# Patient Record
Sex: FEMALE | Race: WHITE | NOT HISPANIC OR LATINO | URBAN - METROPOLITAN AREA
[De-identification: names, ages, dates, MRNs, and addresses within clinical notes are randomized per-mention and may not be internally consistent; named-entity substitution may affect disease eponyms.]

---

## 2020-03-19 ENCOUNTER — EMERGENCY (EMERGENCY)
Facility: HOSPITAL | Age: 85
LOS: 1 days | Discharge: HOME | End: 2020-03-19
Attending: INTERNAL MEDICINE | Admitting: INTERNAL MEDICINE
Payer: MEDICARE

## 2020-03-19 VITALS
HEART RATE: 64 BPM | RESPIRATION RATE: 18 BRPM | DIASTOLIC BLOOD PRESSURE: 61 MMHG | SYSTOLIC BLOOD PRESSURE: 96 MMHG | WEIGHT: 102.07 LBS | TEMPERATURE: 98 F | OXYGEN SATURATION: 98 %

## 2020-03-19 VITALS
HEART RATE: 70 BPM | OXYGEN SATURATION: 99 % | SYSTOLIC BLOOD PRESSURE: 112 MMHG | RESPIRATION RATE: 17 BRPM | DIASTOLIC BLOOD PRESSURE: 70 MMHG

## 2020-03-19 DIAGNOSIS — S01.01XA LACERATION WITHOUT FOREIGN BODY OF SCALP, INITIAL ENCOUNTER: ICD-10-CM

## 2020-03-19 PROCEDURE — 70450 CT HEAD/BRAIN W/O DYE: CPT

## 2020-03-19 PROCEDURE — 90715 TDAP VACCINE 7 YRS/> IM: CPT

## 2020-03-19 PROCEDURE — 90471 IMMUNIZATION ADMIN: CPT

## 2020-03-19 PROCEDURE — 99284 EMERGENCY DEPT VISIT MOD MDM: CPT | Mod: 25

## 2020-03-19 PROCEDURE — 12002 RPR S/N/AX/GEN/TRNK2.6-7.5CM: CPT

## 2020-03-19 PROCEDURE — 70450 CT HEAD/BRAIN W/O DYE: CPT | Mod: 26

## 2020-03-19 RX ORDER — ASPIRIN/CALCIUM CARB/MAGNESIUM 324 MG
1 TABLET ORAL
Qty: 0 | Refills: 0 | DISCHARGE

## 2020-03-19 RX ORDER — METFORMIN HYDROCHLORIDE 850 MG/1
1 TABLET ORAL
Qty: 0 | Refills: 0 | DISCHARGE

## 2020-03-19 RX ORDER — CHOLECALCIFEROL (VITAMIN D3) 125 MCG
0 CAPSULE ORAL
Qty: 0 | Refills: 0 | DISCHARGE

## 2020-03-19 RX ORDER — MULTIVIT-MIN/FERROUS GLUCONATE 9 MG/15 ML
1 LIQUID (ML) ORAL
Qty: 0 | Refills: 0 | DISCHARGE

## 2020-03-19 RX ORDER — TETANUS TOXOID, REDUCED DIPHTHERIA TOXOID AND ACELLULAR PERTUSSIS VACCINE, ADSORBED 5; 2.5; 8; 8; 2.5 [IU]/.5ML; [IU]/.5ML; UG/.5ML; UG/.5ML; UG/.5ML
0.5 SUSPENSION INTRAMUSCULAR ONCE
Refills: 0 | Status: COMPLETED | OUTPATIENT
Start: 2020-03-19 | End: 2020-03-19

## 2020-03-19 RX ADMIN — TETANUS TOXOID, REDUCED DIPHTHERIA TOXOID AND ACELLULAR PERTUSSIS VACCINE, ADSORBED 0.5 MILLILITER(S): 5; 2.5; 8; 8; 2.5 SUSPENSION INTRAMUSCULAR at 17:20

## 2020-03-19 NOTE — ED PROVIDER NOTE - PATIENT PORTAL LINK FT
You can access the FollowMyHealth Patient Portal offered by [facility] by registering at the following website: [email or web address]. By joining [employer]’s FollowMyHealth portal, you will also be able to view your health information using other applications (apps) compatible with our system.

## 2020-03-19 NOTE — ED PROVIDER NOTE - CLINICAL SUMMARY MEDICAL DECISION MAKING FREE TEXT BOX
89 y/o F s/p witnessed trip and fall at home with Aid hitting the back of her head on ASA 81mg daily. Denies LOC, dizziness, nausea, vomiting, visual changes, weakness, numbness, tingling. Alert, oriented, following commands. Check CT head, repair laceration, strict return precautions. 89 y/o F s/p witnessed trip and fall at home with Aid hitting the back of her head on ASA 81mg daily. Denies LOC, dizziness, nausea, vomiting, visual changes, weakness, numbness, tingling. Alert, oriented, following commands. Check CT head, repair laceration, administer Tdap, strict return precautions. 89 y/o F s/p witnessed trip and fall at home with Aid hitting the back of her head on ASA 81mg daily. Denies LOC, dizziness, nausea, vomiting, visual changes, weakness, numbness, tingling. Alert, oriented, following commands. Check CT head, repair laceration, administer Tdap, strict return precautions.   WILMA Blancharderio- 7 staples placed posterior scalp, Tdap adminsitered

## 2020-03-19 NOTE — ED ADULT NURSE NOTE - CAS DISCH TRANSFER METHOD
Detail Level: Detailed Quality 130: Documentation Of Current Medications In The Medical Record: Current Medications Documented Private car

## 2020-03-19 NOTE — ED PROVIDER NOTE - NSFOLLOWUPINSTRUCTIONS_ED_ALL_ED_FT
Follow up with your PMD within 24-48 hrs   Rest, Take Tylenol 650mg every 4-6 hours as needed for pain.  Return to the emergency department with ANY worsening or new concerning symptoms.     Head Injury, Adult:    There are many types of head injuries. Head injuries can be as minor as a bump, or they can be more severe. More severe head injuries include:  A jarring injury to the brain (concussion).  A bruise of the brain (contusion). This means there is bleeding in the brain that can cause swelling.  A cracked skull (skull fracture).  Bleeding in the brain that collects, clots, and forms a bump (hematoma).  After a head injury, you may need to be observed for a while in the emergency department or urgent care. Sometimes admission to the hospital is needed.    After a head injury has happened, most problems occur within the first 24 hours, but side effects may occur up to 7–10 days after the injury. It is important to watch your condition for any changes.    What are the causes?  There are many possible causes of a head injury. A serious head injury may happen to someone who is in a car accident (motor vehicle collision). Other causes of major head injuries include bicycle or motorcycle accidents, sports injuries, and falls.    Risk factors  This condition is more likely to occur in people who:  Drink a lot of alcohol or use drugs.  Are over the age of 65.  Are at risk for falls.  What are the symptoms?  There are many possible symptoms of a head injury. Visible symptoms of a head injury include a bruise, bump, or bleeding at the site of the injury. Other non-visible symptoms include:  Feeling sleepy or not being able to stay awake.  Passing out.  Headache.  Seizures.  Dizziness.  Confusion.  Memory problems.  Nausea or vomiting.  Other possible symptoms that may develop after the head injury include:  Poor attention and concentration.  Fatigue or tiring easily.  Irritability.  Being uncomfortable around bright lights or loud noises.  Anxiety or depression.  Disturbed sleep.  How is this diagnosed?  This condition can usually be diagnosed based on your symptoms, a description of the injury, and a physical exam. You may also have imaging tests done, such as a CT scan or MRI. You will also be closely watched.    How is this treated?  Treatment for this condition depends on the severity and type of injury you have. The main goal of treatment is to prevent complications and allow the brain time to heal.    For mild head injury, you may be sent home and treatment may include:  Observation. A responsible adult should stay with you for 24 hours after your injury and check on you often.  Physical rest.  Brain rest.  Pain medicines.  For severe brain injury, treatment may include:  Close observation. This includes hospitalization with frequent physical exams. You may need to go to a hospital that specializes in head injury.  Pain medicines.  Breathing support. This may include using a ventilator.  Managing the pressure inside the brain (intracranial pressure, or ICP). This may include:  Monitoring the ICP.  Giving medicines to decrease the ICP.  Positioning you to decrease the ICP.  Medicine to prevent seizures.  Surgery to stop bleeding or to remove blood clots (craniotomy).  Surgery to remove part of the skull (decompressive craniectomy). This allows room for the brain to swell.  Follow these instructions at home:  Activity     Rest as much as possible and avoid activities that are physically hard or tiring.  Make sure you get enough sleep.  Limit activities that require a lot of thought or attention, such as:  Watching TV.  Playing memory games and puzzles.  Job-related work or homework.  Working on the computer, social media, and texting.  Avoid activities that could cause another head injury, such as playing sports, until your health care provider approves. Having another head injury, especially before the first one has healed, can be dangerous.  Ask your health care provider when it is safe for you to return to your regular activities, including work or school. Ask your health care provider for a step-by-step plan for gradually returning to activities.  Ask your health care provider when you can drive, ride a bicycle, or use heavy machinery. Your ability to react may be slower after a brain injury. Never do these activities if you are dizzy.  Lifestyle     Do not drink alcohol until your health care provider approves, and avoid drug use. Alcohol and certain drugs may slow your recovery and can put you at risk of further injury.  If it is harder than usual to remember things, write them down.  If you are easily distracted, try to do one thing at a time.  Talk with family members or close friends when making important decisions.  Tell your friends, family, a trusted colleague, and  about your injury, symptoms, and restrictions. Have them watch for any new or worsening problems.  General instructions     Take over-the-counter and prescription medicines only as told by your health care provider.  Have someone stay with you for 24 hours after your head injury. This person should watch you for any changes in your symptoms and be ready to seek medical help, as needed.  Keep all follow-up visits as told by your health care provider. This is important.  How is this prevented?  Work on improving your balance and strength to avoid falls.  Wear a seatbelt when you are in a moving vehicle.  Wear a helmet when riding a bicycle, skiing, or doing any other sport or activity that has a risk of injury.  Drink alcohol only in moderation.  Take safety measures in your home, such as:  Removing clutter and tripping hazards from floors and stairways.  Using grab bars in bathrooms and handrails by stairs.  Placing non-slip mats on floors and in bathtubs.  Improving lighting in dim areas.  Get help right away if:  You have:  A severe headache that is not helped by medicine.  Trouble walking, have weakness in your arms and legs, or lose your balance.  Clear or bloody fluid coming from your nose or ears.  Changes in your vision.  A seizure.  You vomit.  Your symptoms get worse.  Your speech is slurred.  You pass out.  You are sleepier and have trouble staying awake.  Your pupils change size.  These symptoms may represent a serious problem that is an emergency. Do not wait to see if the symptoms will go away. Get medical help right away. Call your local emergency services (911 in the [place] Do not drive yourself to the hospital.     This information is not intended to replace advice given to you by your health care provider. Make sure you discuss any questions you have with your health care provider. Follow up with your PMD within 24-48 hrs   Return to the Emergency department in 10 days to remove the staples.   Keep wound covered for 48 hrs then clean gently with soap and water, apply bacitracin and cover.   Rest, Take Tylenol 650mg every 4-6 hours as needed for pain.  Return to the emergency department with ANY worsening or new concerning symptoms.     Head Injury, Adult:    There are many types of head injuries. Head injuries can be as minor as a bump, or they can be more severe. More severe head injuries include:  A jarring injury to the brain (concussion).  A bruise of the brain (contusion). This means there is bleeding in the brain that can cause swelling.  A cracked skull (skull fracture).  Bleeding in the brain that collects, clots, and forms a bump (hematoma).  After a head injury, you may need to be observed for a while in the emergency department or urgent care. Sometimes admission to the hospital is needed.    After a head injury has happened, most problems occur within the first 24 hours, but side effects may occur up to 7–10 days after the injury. It is important to watch your condition for any changes.    What are the causes?  There are many possible causes of a head injury. A serious head injury may happen to someone who is in a car accident (motor vehicle collision). Other causes of major head injuries include bicycle or motorcycle accidents, sports injuries, and falls.    Risk factors  This condition is more likely to occur in people who:  Drink a lot of alcohol or use drugs.  Are over the age of 65.  Are at risk for falls.  What are the symptoms?  There are many possible symptoms of a head injury. Visible symptoms of a head injury include a bruise, bump, or bleeding at the site of the injury. Other non-visible symptoms include:  Feeling sleepy or not being able to stay awake.  Passing out.  Headache.  Seizures.  Dizziness.  Confusion.  Memory problems.  Nausea or vomiting.  Other possible symptoms that may develop after the head injury include:  Poor attention and concentration.  Fatigue or tiring easily.  Irritability.  Being uncomfortable around bright lights or loud noises.  Anxiety or depression.  Disturbed sleep.  How is this diagnosed?  This condition can usually be diagnosed based on your symptoms, a description of the injury, and a physical exam. You may also have imaging tests done, such as a CT scan or MRI. You will also be closely watched.    How is this treated?  Treatment for this condition depends on the severity and type of injury you have. The main goal of treatment is to prevent complications and allow the brain time to heal.    For mild head injury, you may be sent home and treatment may include:  Observation. A responsible adult should stay with you for 24 hours after your injury and check on you often.  Physical rest.  Brain rest.  Pain medicines.  For severe brain injury, treatment may include:  Close observation. This includes hospitalization with frequent physical exams. You may need to go to a hospital that specializes in head injury.  Pain medicines.  Breathing support. This may include using a ventilator.  Managing the pressure inside the brain (intracranial pressure, or ICP). This may include:  Monitoring the ICP.  Giving medicines to decrease the ICP.  Positioning you to decrease the ICP.  Medicine to prevent seizures.  Surgery to stop bleeding or to remove blood clots (craniotomy).  Surgery to remove part of the skull (decompressive craniectomy). This allows room for the brain to swell.  Follow these instructions at home:  Activity     Rest as much as possible and avoid activities that are physically hard or tiring.  Make sure you get enough sleep.  Limit activities that require a lot of thought or attention, such as:  Watching TV.  Playing memory games and puzzles.  Job-related work or homework.  Working on the computer, social media, and texting.  Avoid activities that could cause another head injury, such as playing sports, until your health care provider approves. Having another head injury, especially before the first one has healed, can be dangerous.  Ask your health care provider when it is safe for you to return to your regular activities, including work or school. Ask your health care provider for a step-by-step plan for gradually returning to activities.  Ask your health care provider when you can drive, ride a bicycle, or use heavy machinery. Your ability to react may be slower after a brain injury. Never do these activities if you are dizzy.  Lifestyle     Do not drink alcohol until your health care provider approves, and avoid drug use. Alcohol and certain drugs may slow your recovery and can put you at risk of further injury.  If it is harder than usual to remember things, write them down.  If you are easily distracted, try to do one thing at a time.  Talk with family members or close friends when making important decisions.  Tell your friends, family, a trusted colleague, and  about your injury, symptoms, and restrictions. Have them watch for any new or worsening problems.  General instructions     Take over-the-counter and prescription medicines only as told by your health care provider.  Have someone stay with you for 24 hours after your head injury. This person should watch you for any changes in your symptoms and be ready to seek medical help, as needed.  Keep all follow-up visits as told by your health care provider. This is important.  How is this prevented?  Work on improving your balance and strength to avoid falls.  Wear a seatbelt when you are in a moving vehicle.  Wear a helmet when riding a bicycle, skiing, or doing any other sport or activity that has a risk of injury.  Drink alcohol only in moderation.  Take safety measures in your home, such as:  Removing clutter and tripping hazards from floors and stairways.  Using grab bars in bathrooms and handrails by stairs.  Placing non-slip mats on floors and in bathtubs.  Improving lighting in dim areas.  Get help right away if:  You have:  A severe headache that is not helped by medicine.  Trouble walking, have weakness in your arms and legs, or lose your balance.  Clear or bloody fluid coming from your nose or ears.  Changes in your vision.  A seizure.  You vomit.  Your symptoms get worse.  Your speech is slurred.  You pass out.  You are sleepier and have trouble staying awake.  Your pupils change size.  These symptoms may represent a serious problem that is an emergency. Do not wait to see if the symptoms will go away. Get medical help right away. Call your local emergency services (911 in the [place] Do not drive yourself to the hospital.     This information is not intended to replace advice given to you by your health care provider. Make sure you discuss any questions you have with your health care provider.

## 2020-03-19 NOTE — ED PROVIDER NOTE - CARE PLAN
Principal Discharge DX:	Traumatic injury of head, initial encounter  Secondary Diagnosis:	Laceration of scalp, initial encounter

## 2020-03-19 NOTE — ED PROVIDER NOTE - OBJECTIVE STATEMENT
89 y/o F s/p witnessed trip and fall at home with Aid hitting the back of her head on ASA 81mg daily. Denies LOC, dizziness, nausea, vomiting, visual changes, weakness, numbness, tingling. Alert, oriented, following commands. 89 y/o F s/p witnessed trip and fall at home with Aid hitting the back of her head on ASA 81mg daily. Denies LOC, dizziness, nausea, vomiting, visual changes, weakness, numbness, tingling. Alert, oriented, following commands. Last Tdap- unknown

## 2020-03-19 NOTE — ED ADULT NURSE NOTE - OBJECTIVE STATEMENT
Patient had a mechanical fall today at home per son. Lac to back of head. Son denies any change in mental status, shortness of breath or weakness.

## 2020-03-19 NOTE — ED PROVIDER NOTE - ATTENDING CONTRIBUTION TO CARE
89 y/o F s/p witnessed trip and fall at home with Aid hitting the back of her head on ASA 81mg daily. Denies LOC, dizziness, nausea, vomiting, visual changes, weakness, numbness, tingling. Alert, oriented, following commands. Check CT head, repair laceration, administer Tdap, strict return precautions.   PA Tiberio- 7 staples placed posterior scalp, Tdap adminsitered  Dr. Abrams:  I have reviewed and discussed with the PA/ resident the case specifics, including the history, physical assessment, evaluation, conclusion, laboratory results, and medical plan. I agree with the contents, and conclusions. I have personally examined, and interviewed the patient.

## 2020-03-19 NOTE — ED ADULT NURSE NOTE - NSIMPLEMENTINTERV_GEN_ALL_ED
Implemented All Fall with Harm Risk Interventions:  [place] to call system. Call bell, personal items and telephone within reach. Instruct patient to call for assistance. Room bathroom lighting operational. Non-slip footwear when patient is off stretcher. Physically safe environment: no spills, clutter or unnecessary equipment. Stretcher in lowest position, wheels locked, appropriate side rails in place. Provide visual cue, wrist band, yellow gown, etc. Monitor gait and stability. Monitor for mental status changes and reorient to person, place, and time. Review medications for side effects contributing to fall risk. Reinforce activity limits and safety measures with patient and family. Provide visual clues: red socks.

## 2020-03-30 ENCOUNTER — EMERGENCY (EMERGENCY)
Facility: HOSPITAL | Age: 85
LOS: 1 days | Discharge: HOME | End: 2020-03-30
Attending: EMERGENCY MEDICINE | Admitting: EMERGENCY MEDICINE
Payer: MEDICARE

## 2020-03-30 VITALS
HEART RATE: 63 BPM | OXYGEN SATURATION: 99 % | SYSTOLIC BLOOD PRESSURE: 108 MMHG | TEMPERATURE: 98 F | RESPIRATION RATE: 16 BRPM | DIASTOLIC BLOOD PRESSURE: 75 MMHG

## 2020-03-30 DIAGNOSIS — S01.01XD LACERATION WITHOUT FOREIGN BODY OF SCALP, SUBSEQUENT ENCOUNTER: ICD-10-CM

## 2020-03-30 PROBLEM — Z78.9 OTHER SPECIFIED HEALTH STATUS: Chronic | Status: ACTIVE | Noted: 2020-03-19

## 2020-03-30 PROCEDURE — G0463: CPT

## 2020-03-30 PROCEDURE — L9995: CPT

## 2020-03-30 NOTE — ED ADULT NURSE NOTE - NSHOSCREENINGQ1_ED_ALL_ED
Dapsone Pregnancy And Lactation Text: This medication is Pregnancy Category C and is not considered safe during pregnancy or breast feeding. No

## 2020-03-30 NOTE — ED PROVIDER NOTE - NEURO NEGATIVE STATEMENT, MLM
I have seen, taken a history and examined the patient. I agree with the evaluation and management plan   no loss of consciousness, no gait abnormality, no headache, no sensory deficits, and no weakness.

## 2020-03-30 NOTE — ED PROVIDER NOTE - PATIENT PORTAL LINK FT
You can access the FollowMyHealth Patient Portal offered by [facility] by registering at the following website: [email or web address]. By joining [email or web address]’s FollowMyHealth portal, you will also be able to view your health information using other applications (apps) compatible with our system.

## 2020-03-30 NOTE — ED PROVIDER NOTE - NSFOLLOWUPINSTRUCTIONS_ED_ALL_ED_FT
STAPLE CARE - AfterCare(R) Instructions(ER/ED)     Staple Care    WHAT YOU NEED TO KNOW:    Staples are often used to close a wound. Your staples may be placed for 3 to 14 days, depending on the location of your wound.    DISCHARGE INSTRUCTIONS:    Care for your wound:     Clean:   You may be able to shower in 24 hours. Do not soak your wound under water.      Gently wash your wound with soap and warm water daily. Lightly pat it dry. Do not cover your wound unless your healthcare provider tells you to.       You may also need to clean your wound with a mixture of hydrogen peroxide and water. Ask how to do this.      Do not apply ointment or cream to the wound unless your healthcare provider tells you to.      Elevate:   Rest any arm or leg that has a wound on pillows above the level of your heart. Do this as often as possible for 2 days. This will help decrease swelling and pain, and help you heal faster.          Minimize scarring:   Avoid sunshine on your wound to reduce scarring.         Follow up with your healthcare provider as directed: You may need to return for a wound checkup 3 days after your staples are placed. Ask when you should return to get your staples removed.    Staple removal:     A medical staple remover will be used to take out your staples. Your healthcare provider will slide the tool under each staple, squeeze the handle, and gently pull the staple out.       Medical tape will be placed on your wound once your staples are removed. This will help keep your wound closed. The medical tape will fall off on its own after several days.     Contact your healthcare provider if:     You have redness, pain, swelling, and pus draining from your wound.      Your pain medicine does not relieve your pain.      You have a fever of 101°F (38.5°C) or higher.      You have an odor coming from your wound.      You have questions or concerns about your condition or care.    Return to the emergency department if:     Your wound reopens.      You have red streaks in your skin that spread out from your wound.      You have severe pain or vomiting.         © Copyright [employer] 2020       back to top                      © Copyright [employer] 2020

## 2020-03-30 NOTE — ED ADULT NURSE NOTE - OBJECTIVE STATEMENT
Pt presents to ED for suture removal. Sutures placed on 3/19 s/p fall. Denies fever. Incision healing well.

## 2021-01-01 ENCOUNTER — INPATIENT (INPATIENT)
Facility: HOSPITAL | Age: 86
LOS: 3 days | DRG: 871 | End: 2021-07-11
Attending: INTERNAL MEDICINE | Admitting: INTERNAL MEDICINE
Payer: MEDICARE

## 2021-01-01 VITALS
SYSTOLIC BLOOD PRESSURE: 100 MMHG | DIASTOLIC BLOOD PRESSURE: 68 MMHG | HEART RATE: 69 BPM | OXYGEN SATURATION: 98 % | RESPIRATION RATE: 15 BRPM | TEMPERATURE: 99 F

## 2021-01-01 VITALS
HEART RATE: 85 BPM | WEIGHT: 95.02 LBS | HEIGHT: 60 IN | DIASTOLIC BLOOD PRESSURE: 45 MMHG | OXYGEN SATURATION: 94 % | RESPIRATION RATE: 12 BRPM | SYSTOLIC BLOOD PRESSURE: 87 MMHG | TEMPERATURE: 97 F

## 2021-01-01 DIAGNOSIS — A41.9 SEPSIS, UNSPECIFIED ORGANISM: ICD-10-CM

## 2021-01-01 DIAGNOSIS — E11.9 TYPE 2 DIABETES MELLITUS WITHOUT COMPLICATIONS: ICD-10-CM

## 2021-01-01 DIAGNOSIS — J96.90 RESPIRATORY FAILURE, UNSPECIFIED, UNSPECIFIED WHETHER WITH HYPOXIA OR HYPERCAPNIA: ICD-10-CM

## 2021-01-01 DIAGNOSIS — Z29.9 ENCOUNTER FOR PROPHYLACTIC MEASURES, UNSPECIFIED: ICD-10-CM

## 2021-01-01 DIAGNOSIS — F03.90 UNSPECIFIED DEMENTIA WITHOUT BEHAVIORAL DISTURBANCE: ICD-10-CM

## 2021-01-01 DIAGNOSIS — E78.5 HYPERLIPIDEMIA, UNSPECIFIED: ICD-10-CM

## 2021-01-01 LAB
A1C WITH ESTIMATED AVERAGE GLUCOSE RESULT: 5.4 % — SIGNIFICANT CHANGE UP (ref 4–5.6)
A1C WITH ESTIMATED AVERAGE GLUCOSE RESULT: 5.7 % — HIGH (ref 4–5.6)
ALBUMIN SERPL ELPH-MCNC: 2.6 G/DL — LOW (ref 3.3–5)
ALBUMIN SERPL ELPH-MCNC: 2.8 G/DL — LOW (ref 3.3–5)
ALBUMIN SERPL ELPH-MCNC: 2.9 G/DL — LOW (ref 3.3–5)
ALP SERPL-CCNC: 69 U/L — SIGNIFICANT CHANGE UP (ref 40–120)
ALP SERPL-CCNC: 94 U/L — SIGNIFICANT CHANGE UP (ref 40–120)
ALP SERPL-CCNC: 96 U/L — SIGNIFICANT CHANGE UP (ref 40–120)
ALT FLD-CCNC: 278 U/L — HIGH (ref 10–45)
ALT FLD-CCNC: 326 U/L — HIGH (ref 10–45)
ALT FLD-CCNC: 330 U/L — HIGH (ref 10–45)
ANION GAP SERPL CALC-SCNC: 3 MMOL/L — LOW (ref 5–17)
ANION GAP SERPL CALC-SCNC: 8 MMOL/L — SIGNIFICANT CHANGE UP (ref 5–17)
ANION GAP SERPL CALC-SCNC: 9 MMOL/L — SIGNIFICANT CHANGE UP (ref 5–17)
APPEARANCE UR: ABNORMAL
APPEARANCE UR: CLEAR — SIGNIFICANT CHANGE UP
AST SERPL-CCNC: 119 U/L — HIGH (ref 10–40)
AST SERPL-CCNC: 338 U/L — HIGH (ref 10–40)
AST SERPL-CCNC: 355 U/L — HIGH (ref 10–40)
B PERT DNA SPEC QL NAA+PROBE: SIGNIFICANT CHANGE UP
BACTERIA # UR AUTO: ABNORMAL /HPF
BACTERIA # UR AUTO: ABNORMAL /HPF
BASOPHILS # BLD AUTO: 0.03 K/UL — SIGNIFICANT CHANGE UP (ref 0–0.2)
BASOPHILS NFR BLD AUTO: 0.2 % — SIGNIFICANT CHANGE UP (ref 0–2)
BILIRUB SERPL-MCNC: 0.4 MG/DL — SIGNIFICANT CHANGE UP (ref 0.2–1.2)
BILIRUB SERPL-MCNC: 0.6 MG/DL — SIGNIFICANT CHANGE UP (ref 0.2–1.2)
BILIRUB SERPL-MCNC: 0.9 MG/DL — SIGNIFICANT CHANGE UP (ref 0.2–1.2)
BILIRUB UR-MCNC: ABNORMAL
BILIRUB UR-MCNC: NEGATIVE — SIGNIFICANT CHANGE UP
BLOOD GAS COMMENTS ARTERIAL: SIGNIFICANT CHANGE UP
BLOOD GAS COMMENTS ARTERIAL: SIGNIFICANT CHANGE UP
BLOOD GAS COMMENTS, VENOUS: SIGNIFICANT CHANGE UP
BUN SERPL-MCNC: 61 MG/DL — HIGH (ref 7–23)
BUN SERPL-MCNC: 66 MG/DL — HIGH (ref 7–23)
BUN SERPL-MCNC: 67 MG/DL — HIGH (ref 7–23)
C PNEUM DNA SPEC QL NAA+PROBE: SIGNIFICANT CHANGE UP
CALCIUM SERPL-MCNC: 7.8 MG/DL — LOW (ref 8.4–10.5)
CALCIUM SERPL-MCNC: 7.8 MG/DL — LOW (ref 8.4–10.5)
CALCIUM SERPL-MCNC: 9 MG/DL — SIGNIFICANT CHANGE UP (ref 8.4–10.5)
CHLORIDE SERPL-SCNC: 101 MMOL/L — SIGNIFICANT CHANGE UP (ref 96–108)
CHLORIDE SERPL-SCNC: 102 MMOL/L — SIGNIFICANT CHANGE UP (ref 96–108)
CHLORIDE SERPL-SCNC: 104 MMOL/L — SIGNIFICANT CHANGE UP (ref 96–108)
CK SERPL-CCNC: 294 U/L — HIGH (ref 25–170)
CO2 BLDV-SCNC: 31 MMOL/L — HIGH (ref 21–29)
CO2 SERPL-SCNC: 26 MMOL/L — SIGNIFICANT CHANGE UP (ref 22–31)
CO2 SERPL-SCNC: 27 MMOL/L — SIGNIFICANT CHANGE UP (ref 22–31)
CO2 SERPL-SCNC: 32 MMOL/L — HIGH (ref 22–31)
COLOR SPEC: YELLOW — SIGNIFICANT CHANGE UP
COLOR SPEC: YELLOW — SIGNIFICANT CHANGE UP
CREAT SERPL-MCNC: 1.14 MG/DL — SIGNIFICANT CHANGE UP (ref 0.5–1.3)
CREAT SERPL-MCNC: 1.3 MG/DL — SIGNIFICANT CHANGE UP (ref 0.5–1.3)
CREAT SERPL-MCNC: 1.34 MG/DL — HIGH (ref 0.5–1.3)
DIFF PNL FLD: ABNORMAL
DIFF PNL FLD: ABNORMAL
EOSINOPHIL # BLD AUTO: 0.01 K/UL — SIGNIFICANT CHANGE UP (ref 0–0.5)
EOSINOPHIL NFR BLD AUTO: 0.1 % — SIGNIFICANT CHANGE UP (ref 0–6)
EPI CELLS # UR: SIGNIFICANT CHANGE UP
EPI CELLS # UR: SIGNIFICANT CHANGE UP
ESTIMATED AVERAGE GLUCOSE: 108 MG/DL — SIGNIFICANT CHANGE UP (ref 68–114)
ESTIMATED AVERAGE GLUCOSE: 117 MG/DL — HIGH (ref 68–114)
FLUAV H1 2009 PAND RNA SPEC QL NAA+PROBE: SIGNIFICANT CHANGE UP
FLUAV H1 RNA SPEC QL NAA+PROBE: SIGNIFICANT CHANGE UP
FLUAV H3 RNA SPEC QL NAA+PROBE: SIGNIFICANT CHANGE UP
FLUAV SUBTYP SPEC NAA+PROBE: SIGNIFICANT CHANGE UP
FLUBV RNA SPEC QL NAA+PROBE: SIGNIFICANT CHANGE UP
GAS PNL BLDA: SIGNIFICANT CHANGE UP
GAS PNL BLDV: SIGNIFICANT CHANGE UP
GLUCOSE BLDC GLUCOMTR-MCNC: 121 MG/DL — HIGH (ref 70–99)
GLUCOSE BLDC GLUCOMTR-MCNC: 130 MG/DL — HIGH (ref 70–99)
GLUCOSE BLDC GLUCOMTR-MCNC: 130 MG/DL — HIGH (ref 70–99)
GLUCOSE BLDC GLUCOMTR-MCNC: 137 MG/DL — HIGH (ref 70–99)
GLUCOSE BLDC GLUCOMTR-MCNC: 139 MG/DL — HIGH (ref 70–99)
GLUCOSE BLDC GLUCOMTR-MCNC: 142 MG/DL — HIGH (ref 70–99)
GLUCOSE BLDC GLUCOMTR-MCNC: 150 MG/DL — HIGH (ref 70–99)
GLUCOSE BLDC GLUCOMTR-MCNC: 155 MG/DL — HIGH (ref 70–99)
GLUCOSE BLDC GLUCOMTR-MCNC: 158 MG/DL — HIGH (ref 70–99)
GLUCOSE BLDC GLUCOMTR-MCNC: 209 MG/DL — HIGH (ref 70–99)
GLUCOSE BLDC GLUCOMTR-MCNC: 216 MG/DL — HIGH (ref 70–99)
GLUCOSE BLDC GLUCOMTR-MCNC: 216 MG/DL — HIGH (ref 70–99)
GLUCOSE BLDC GLUCOMTR-MCNC: 264 MG/DL — HIGH (ref 70–99)
GLUCOSE SERPL-MCNC: 137 MG/DL — HIGH (ref 70–99)
GLUCOSE SERPL-MCNC: 178 MG/DL — HIGH (ref 70–99)
GLUCOSE SERPL-MCNC: 211 MG/DL — HIGH (ref 70–99)
GLUCOSE UR QL: NEGATIVE — SIGNIFICANT CHANGE UP
GLUCOSE UR QL: NEGATIVE — SIGNIFICANT CHANGE UP
HADV DNA SPEC QL NAA+PROBE: SIGNIFICANT CHANGE UP
HCOV PNL SPEC NAA+PROBE: SIGNIFICANT CHANGE UP
HCT VFR BLD CALC: 35.3 % — SIGNIFICANT CHANGE UP (ref 34.5–45)
HCT VFR BLD CALC: 35.5 % — SIGNIFICANT CHANGE UP (ref 34.5–45)
HGB BLD-MCNC: 11.3 G/DL — LOW (ref 11.5–15.5)
HGB BLD-MCNC: 11.4 G/DL — LOW (ref 11.5–15.5)
HMPV RNA SPEC QL NAA+PROBE: SIGNIFICANT CHANGE UP
HOROWITZ INDEX BLDA+IHG-RTO: SIGNIFICANT CHANGE UP
HOROWITZ INDEX BLDA+IHG-RTO: SIGNIFICANT CHANGE UP
HPIV1 RNA SPEC QL NAA+PROBE: SIGNIFICANT CHANGE UP
HPIV2 RNA SPEC QL NAA+PROBE: SIGNIFICANT CHANGE UP
HPIV3 RNA SPEC QL NAA+PROBE: SIGNIFICANT CHANGE UP
HPIV4 RNA SPEC QL NAA+PROBE: SIGNIFICANT CHANGE UP
IMM GRANULOCYTES NFR BLD AUTO: 0.9 % — SIGNIFICANT CHANGE UP (ref 0–1.5)
KETONES UR-MCNC: ABNORMAL
KETONES UR-MCNC: NEGATIVE — SIGNIFICANT CHANGE UP
LACTATE SERPL-SCNC: 1.5 MMOL/L — SIGNIFICANT CHANGE UP (ref 0.7–2)
LACTATE SERPL-SCNC: 1.6 MMOL/L — SIGNIFICANT CHANGE UP (ref 0.7–2)
LACTATE SERPL-SCNC: 2.1 MMOL/L — HIGH (ref 0.7–2)
LEUKOCYTE ESTERASE UR-ACNC: ABNORMAL
LEUKOCYTE ESTERASE UR-ACNC: ABNORMAL
LYMPHOCYTES # BLD AUTO: 0.91 K/UL — LOW (ref 1–3.3)
LYMPHOCYTES # BLD AUTO: 7.1 % — LOW (ref 13–44)
MAGNESIUM SERPL-MCNC: 2.5 MG/DL — SIGNIFICANT CHANGE UP (ref 1.6–2.6)
MAGNESIUM SERPL-MCNC: 2.7 MG/DL — HIGH (ref 1.6–2.6)
MCHC RBC-ENTMCNC: 30.4 PG — SIGNIFICANT CHANGE UP (ref 27–34)
MCHC RBC-ENTMCNC: 30.9 PG — SIGNIFICANT CHANGE UP (ref 27–34)
MCHC RBC-ENTMCNC: 32 GM/DL — SIGNIFICANT CHANGE UP (ref 32–36)
MCHC RBC-ENTMCNC: 32.1 GM/DL — SIGNIFICANT CHANGE UP (ref 32–36)
MCV RBC AUTO: 94.7 FL — SIGNIFICANT CHANGE UP (ref 80–100)
MCV RBC AUTO: 96.4 FL — SIGNIFICANT CHANGE UP (ref 80–100)
MONOCYTES # BLD AUTO: 1.81 K/UL — HIGH (ref 0–0.9)
MONOCYTES NFR BLD AUTO: 14.1 % — HIGH (ref 2–14)
NEUTROPHILS # BLD AUTO: 9.98 K/UL — HIGH (ref 1.8–7.4)
NEUTROPHILS NFR BLD AUTO: 77.6 % — HIGH (ref 43–77)
NITRITE UR-MCNC: NEGATIVE — SIGNIFICANT CHANGE UP
NITRITE UR-MCNC: NEGATIVE — SIGNIFICANT CHANGE UP
NRBC # BLD: 0 /100 WBCS — SIGNIFICANT CHANGE UP (ref 0–0)
NRBC # BLD: 0 /100 WBCS — SIGNIFICANT CHANGE UP (ref 0–0)
PCO2 BLDA: 61 MMHG — HIGH (ref 32–46)
PCO2 BLDA: 72 MMHG — CRITICAL HIGH (ref 32–46)
PCO2 BLDA: 94 MMHG — CRITICAL HIGH (ref 32–46)
PCO2 BLDV: 66 MMHG — HIGH (ref 35–50)
PH BLDA: 7.15 — CRITICAL LOW (ref 7.35–7.45)
PH BLDA: 7.22 — LOW (ref 7.35–7.45)
PH BLDA: 7.24 — LOW (ref 7.35–7.45)
PH BLDV: 7.26 — LOW (ref 7.35–7.45)
PH UR: 5 — SIGNIFICANT CHANGE UP (ref 5–8)
PH UR: 5 — SIGNIFICANT CHANGE UP (ref 5–8)
PHOSPHATE SERPL-MCNC: 5.8 MG/DL — HIGH (ref 2.5–4.5)
PHOSPHATE SERPL-MCNC: 5.8 MG/DL — HIGH (ref 2.5–4.5)
PLATELET # BLD AUTO: 170 K/UL — SIGNIFICANT CHANGE UP (ref 150–400)
PLATELET # BLD AUTO: 191 K/UL — SIGNIFICANT CHANGE UP (ref 150–400)
PO2 BLDA: 151 MMHG — HIGH (ref 74–108)
PO2 BLDA: 274 MMHG — HIGH (ref 74–108)
PO2 BLDA: 82 MMHG — SIGNIFICANT CHANGE UP (ref 74–108)
PO2 BLDV: 33 MMHG — SIGNIFICANT CHANGE UP (ref 25–45)
POTASSIUM SERPL-MCNC: 4.7 MMOL/L — SIGNIFICANT CHANGE UP (ref 3.5–5.3)
POTASSIUM SERPL-MCNC: 5.4 MMOL/L — HIGH (ref 3.5–5.3)
POTASSIUM SERPL-MCNC: 6.7 MMOL/L — CRITICAL HIGH (ref 3.5–5.3)
POTASSIUM SERPL-SCNC: 4.7 MMOL/L — SIGNIFICANT CHANGE UP (ref 3.5–5.3)
POTASSIUM SERPL-SCNC: 5.4 MMOL/L — HIGH (ref 3.5–5.3)
POTASSIUM SERPL-SCNC: 6.7 MMOL/L — CRITICAL HIGH (ref 3.5–5.3)
PROT SERPL-MCNC: 6.9 G/DL — SIGNIFICANT CHANGE UP (ref 6–8.3)
PROT SERPL-MCNC: 6.9 G/DL — SIGNIFICANT CHANGE UP (ref 6–8.3)
PROT SERPL-MCNC: 7.5 G/DL — SIGNIFICANT CHANGE UP (ref 6–8.3)
PROT UR-MCNC: 100
PROT UR-MCNC: 15
RAPID RVP RESULT: SIGNIFICANT CHANGE UP
RBC # BLD: 3.66 M/UL — LOW (ref 3.8–5.2)
RBC # BLD: 3.75 M/UL — LOW (ref 3.8–5.2)
RBC # FLD: 14.6 % — HIGH (ref 10.3–14.5)
RBC # FLD: 14.9 % — HIGH (ref 10.3–14.5)
RBC CASTS # UR COMP ASSIST: ABNORMAL /HPF (ref 0–4)
RBC CASTS # UR COMP ASSIST: ABNORMAL /HPF (ref 0–4)
RV+EV RNA SPEC QL NAA+PROBE: SIGNIFICANT CHANGE UP
SAO2 % BLDA: 93 % — SIGNIFICANT CHANGE UP (ref 92–96)
SAO2 % BLDA: 98 % — HIGH (ref 92–96)
SAO2 % BLDA: 98 % — HIGH (ref 92–96)
SAO2 % BLDV: 48 % — LOW (ref 67–88)
SARS-COV-2 RNA SPEC QL NAA+PROBE: SIGNIFICANT CHANGE UP
SODIUM SERPL-SCNC: 135 MMOL/L — SIGNIFICANT CHANGE UP (ref 135–145)
SODIUM SERPL-SCNC: 137 MMOL/L — SIGNIFICANT CHANGE UP (ref 135–145)
SODIUM SERPL-SCNC: 140 MMOL/L — SIGNIFICANT CHANGE UP (ref 135–145)
SP GR SPEC: 1.01 — SIGNIFICANT CHANGE UP (ref 1.01–1.02)
SP GR SPEC: 1.02 — SIGNIFICANT CHANGE UP (ref 1.01–1.02)
TROPONIN I SERPL-MCNC: 0.19 NG/ML — HIGH (ref 0.02–0.06)
UROBILINOGEN FLD QL: 1
UROBILINOGEN FLD QL: NEGATIVE — SIGNIFICANT CHANGE UP
WBC # BLD: 12.85 K/UL — HIGH (ref 3.8–10.5)
WBC # BLD: 7.27 K/UL — SIGNIFICANT CHANGE UP (ref 3.8–10.5)
WBC # FLD AUTO: 12.85 K/UL — HIGH (ref 3.8–10.5)
WBC # FLD AUTO: 7.27 K/UL — SIGNIFICANT CHANGE UP (ref 3.8–10.5)
WBC UR QL: >50 /HPF (ref 0–5)
WBC UR QL: ABNORMAL /HPF (ref 0–5)

## 2021-01-01 PROCEDURE — 99223 1ST HOSP IP/OBS HIGH 75: CPT

## 2021-01-01 PROCEDURE — 83605 ASSAY OF LACTIC ACID: CPT

## 2021-01-01 PROCEDURE — 96361 HYDRATE IV INFUSION ADD-ON: CPT

## 2021-01-01 PROCEDURE — 81001 URINALYSIS AUTO W/SCOPE: CPT

## 2021-01-01 PROCEDURE — 99291 CRITICAL CARE FIRST HOUR: CPT

## 2021-01-01 PROCEDURE — 36415 COLL VENOUS BLD VENIPUNCTURE: CPT

## 2021-01-01 PROCEDURE — 93306 TTE W/DOPPLER COMPLETE: CPT | Mod: 26

## 2021-01-01 PROCEDURE — 94660 CPAP INITIATION&MGMT: CPT

## 2021-01-01 PROCEDURE — 93010 ELECTROCARDIOGRAM REPORT: CPT

## 2021-01-01 PROCEDURE — 84484 ASSAY OF TROPONIN QUANT: CPT

## 2021-01-01 PROCEDURE — 84100 ASSAY OF PHOSPHORUS: CPT

## 2021-01-01 PROCEDURE — 87186 SC STD MICRODIL/AGAR DIL: CPT

## 2021-01-01 PROCEDURE — 93306 TTE W/DOPPLER COMPLETE: CPT

## 2021-01-01 PROCEDURE — 87086 URINE CULTURE/COLONY COUNT: CPT

## 2021-01-01 PROCEDURE — 93005 ELECTROCARDIOGRAM TRACING: CPT

## 2021-01-01 PROCEDURE — 87077 CULTURE AEROBIC IDENTIFY: CPT

## 2021-01-01 PROCEDURE — 71250 CT THORAX DX C-: CPT | Mod: MA

## 2021-01-01 PROCEDURE — 99497 ADVNCD CARE PLAN 30 MIN: CPT | Mod: 25

## 2021-01-01 PROCEDURE — 86901 BLOOD TYPING SEROLOGIC RH(D): CPT

## 2021-01-01 PROCEDURE — 99232 SBSQ HOSP IP/OBS MODERATE 35: CPT

## 2021-01-01 PROCEDURE — 86900 BLOOD TYPING SEROLOGIC ABO: CPT

## 2021-01-01 PROCEDURE — 74176 CT ABD & PELVIS W/O CONTRAST: CPT | Mod: 26,MA

## 2021-01-01 PROCEDURE — 74176 CT ABD & PELVIS W/O CONTRAST: CPT | Mod: MA

## 2021-01-01 PROCEDURE — 87040 BLOOD CULTURE FOR BACTERIA: CPT

## 2021-01-01 PROCEDURE — 71250 CT THORAX DX C-: CPT | Mod: 26,MA

## 2021-01-01 PROCEDURE — 99291 CRITICAL CARE FIRST HOUR: CPT | Mod: CS

## 2021-01-01 PROCEDURE — 71045 X-RAY EXAM CHEST 1 VIEW: CPT

## 2021-01-01 PROCEDURE — 83735 ASSAY OF MAGNESIUM: CPT

## 2021-01-01 PROCEDURE — 82962 GLUCOSE BLOOD TEST: CPT

## 2021-01-01 PROCEDURE — 36600 WITHDRAWAL OF ARTERIAL BLOOD: CPT

## 2021-01-01 PROCEDURE — 80053 COMPREHEN METABOLIC PANEL: CPT

## 2021-01-01 PROCEDURE — 92610 EVALUATE SWALLOWING FUNCTION: CPT

## 2021-01-01 PROCEDURE — 85025 COMPLETE CBC W/AUTO DIFF WBC: CPT

## 2021-01-01 PROCEDURE — 71045 X-RAY EXAM CHEST 1 VIEW: CPT | Mod: 26

## 2021-01-01 PROCEDURE — 82803 BLOOD GASES ANY COMBINATION: CPT

## 2021-01-01 PROCEDURE — 83036 HEMOGLOBIN GLYCOSYLATED A1C: CPT

## 2021-01-01 PROCEDURE — 82550 ASSAY OF CK (CPK): CPT

## 2021-01-01 PROCEDURE — 96365 THER/PROPH/DIAG IV INF INIT: CPT

## 2021-01-01 PROCEDURE — 70450 CT HEAD/BRAIN W/O DYE: CPT | Mod: MA

## 2021-01-01 PROCEDURE — 86850 RBC ANTIBODY SCREEN: CPT

## 2021-01-01 PROCEDURE — 0225U NFCT DS DNA&RNA 21 SARSCOV2: CPT

## 2021-01-01 PROCEDURE — 85027 COMPLETE CBC AUTOMATED: CPT

## 2021-01-01 PROCEDURE — 70450 CT HEAD/BRAIN W/O DYE: CPT | Mod: 26,MA

## 2021-01-01 RX ORDER — DEXTROSE 50 % IN WATER 50 %
25 SYRINGE (ML) INTRAVENOUS ONCE
Refills: 0 | Status: DISCONTINUED | OUTPATIENT
Start: 2021-01-01 | End: 2021-01-01

## 2021-01-01 RX ORDER — CEFEPIME 1 G/1
1000 INJECTION, POWDER, FOR SOLUTION INTRAMUSCULAR; INTRAVENOUS EVERY 12 HOURS
Refills: 0 | Status: DISCONTINUED | OUTPATIENT
Start: 2021-01-01 | End: 2021-01-01

## 2021-01-01 RX ORDER — VANCOMYCIN HCL 1 G
750 VIAL (EA) INTRAVENOUS DAILY
Refills: 0 | Status: DISCONTINUED | OUTPATIENT
Start: 2021-01-01 | End: 2021-01-01

## 2021-01-01 RX ORDER — VANCOMYCIN HCL 1 G
1000 VIAL (EA) INTRAVENOUS ONCE
Refills: 0 | Status: COMPLETED | OUTPATIENT
Start: 2021-01-01 | End: 2021-01-01

## 2021-01-01 RX ORDER — INSULIN LISPRO 100/ML
VIAL (ML) SUBCUTANEOUS AT BEDTIME
Refills: 0 | Status: DISCONTINUED | OUTPATIENT
Start: 2021-01-01 | End: 2021-01-01

## 2021-01-01 RX ORDER — FUROSEMIDE 40 MG
40 TABLET ORAL ONCE
Refills: 0 | Status: COMPLETED | OUTPATIENT
Start: 2021-01-01 | End: 2021-01-01

## 2021-01-01 RX ORDER — INSULIN LISPRO 100/ML
VIAL (ML) SUBCUTANEOUS EVERY 6 HOURS
Refills: 0 | Status: DISCONTINUED | OUTPATIENT
Start: 2021-01-01 | End: 2021-01-01

## 2021-01-01 RX ORDER — SODIUM CHLORIDE 9 MG/ML
500 INJECTION INTRAMUSCULAR; INTRAVENOUS; SUBCUTANEOUS ONCE
Refills: 0 | Status: COMPLETED | OUTPATIENT
Start: 2021-01-01 | End: 2021-01-01

## 2021-01-01 RX ORDER — SODIUM CHLORIDE 9 MG/ML
1000 INJECTION INTRAMUSCULAR; INTRAVENOUS; SUBCUTANEOUS ONCE
Refills: 0 | Status: COMPLETED | OUTPATIENT
Start: 2021-01-01 | End: 2021-01-01

## 2021-01-01 RX ORDER — DEXTROSE 50 % IN WATER 50 %
12.5 SYRINGE (ML) INTRAVENOUS ONCE
Refills: 0 | Status: DISCONTINUED | OUTPATIENT
Start: 2021-01-01 | End: 2021-01-01

## 2021-01-01 RX ORDER — ALBUTEROL 90 UG/1
1 AEROSOL, METERED ORAL EVERY 4 HOURS
Refills: 0 | Status: DISCONTINUED | OUTPATIENT
Start: 2021-01-01 | End: 2021-01-01

## 2021-01-01 RX ORDER — HYDROCORTISONE 20 MG
125 TABLET ORAL ONCE
Refills: 0 | Status: DISCONTINUED | OUTPATIENT
Start: 2021-01-01 | End: 2021-01-01

## 2021-01-01 RX ORDER — SODIUM CHLORIDE 9 MG/ML
1000 INJECTION, SOLUTION INTRAVENOUS
Refills: 0 | Status: DISCONTINUED | OUTPATIENT
Start: 2021-01-01 | End: 2021-01-01

## 2021-01-01 RX ORDER — CEFEPIME 1 G/1
1000 INJECTION, POWDER, FOR SOLUTION INTRAMUSCULAR; INTRAVENOUS ONCE
Refills: 0 | Status: COMPLETED | OUTPATIENT
Start: 2021-01-01 | End: 2021-01-01

## 2021-01-01 RX ORDER — HYDROCORTISONE 20 MG
50 TABLET ORAL EVERY 8 HOURS
Refills: 0 | Status: DISCONTINUED | OUTPATIENT
Start: 2021-01-01 | End: 2021-01-01

## 2021-01-01 RX ORDER — MORPHINE SULFATE 50 MG/1
1 CAPSULE, EXTENDED RELEASE ORAL EVERY 6 HOURS
Refills: 0 | Status: DISCONTINUED | OUTPATIENT
Start: 2021-01-01 | End: 2021-01-01

## 2021-01-01 RX ORDER — ENOXAPARIN SODIUM 100 MG/ML
40 INJECTION SUBCUTANEOUS DAILY
Refills: 0 | Status: DISCONTINUED | OUTPATIENT
Start: 2021-01-01 | End: 2021-01-01

## 2021-01-01 RX ORDER — MORPHINE SULFATE 50 MG/1
0.5 CAPSULE, EXTENDED RELEASE ORAL
Refills: 0 | Status: DISCONTINUED | OUTPATIENT
Start: 2021-01-01 | End: 2021-01-01

## 2021-01-01 RX ORDER — ACETAMINOPHEN 500 MG
650 TABLET ORAL ONCE
Refills: 0 | Status: COMPLETED | OUTPATIENT
Start: 2021-01-01 | End: 2021-01-01

## 2021-01-01 RX ORDER — DEXTROSE 50 % IN WATER 50 %
15 SYRINGE (ML) INTRAVENOUS ONCE
Refills: 0 | Status: DISCONTINUED | OUTPATIENT
Start: 2021-01-01 | End: 2021-01-01

## 2021-01-01 RX ORDER — GLUCAGON INJECTION, SOLUTION 0.5 MG/.1ML
1 INJECTION, SOLUTION SUBCUTANEOUS ONCE
Refills: 0 | Status: DISCONTINUED | OUTPATIENT
Start: 2021-01-01 | End: 2021-01-01

## 2021-01-01 RX ADMIN — Medication 40 MILLIGRAM(S): at 12:05

## 2021-01-01 RX ADMIN — Medication 40 MILLIGRAM(S): at 13:05

## 2021-01-01 RX ADMIN — Medication 40 MILLIGRAM(S): at 06:26

## 2021-01-01 RX ADMIN — Medication 50 MILLIGRAM(S): at 00:54

## 2021-01-01 RX ADMIN — ENOXAPARIN SODIUM 40 MILLIGRAM(S): 100 INJECTION SUBCUTANEOUS at 15:28

## 2021-01-01 RX ADMIN — Medication 250 MILLIGRAM(S): at 12:23

## 2021-01-01 RX ADMIN — Medication 250 MILLIGRAM(S): at 12:18

## 2021-01-01 RX ADMIN — ENOXAPARIN SODIUM 40 MILLIGRAM(S): 100 INJECTION SUBCUTANEOUS at 12:05

## 2021-01-01 RX ADMIN — MORPHINE SULFATE 0.5 MILLIGRAM(S): 50 CAPSULE, EXTENDED RELEASE ORAL at 15:34

## 2021-01-01 RX ADMIN — MORPHINE SULFATE 1 MILLIGRAM(S): 50 CAPSULE, EXTENDED RELEASE ORAL at 13:27

## 2021-01-01 RX ADMIN — ENOXAPARIN SODIUM 40 MILLIGRAM(S): 100 INJECTION SUBCUTANEOUS at 12:18

## 2021-01-01 RX ADMIN — Medication 40 MILLIGRAM(S): at 21:44

## 2021-01-01 RX ADMIN — Medication 40 MILLIGRAM(S): at 06:07

## 2021-01-01 RX ADMIN — Medication 40 MILLIGRAM(S): at 20:48

## 2021-01-01 RX ADMIN — CEFEPIME 100 MILLIGRAM(S): 1 INJECTION, POWDER, FOR SOLUTION INTRAMUSCULAR; INTRAVENOUS at 12:05

## 2021-01-01 RX ADMIN — CEFEPIME 100 MILLIGRAM(S): 1 INJECTION, POWDER, FOR SOLUTION INTRAMUSCULAR; INTRAVENOUS at 23:12

## 2021-01-01 RX ADMIN — Medication 40 MILLIGRAM(S): at 18:08

## 2021-01-01 RX ADMIN — CEFEPIME 100 MILLIGRAM(S): 1 INJECTION, POWDER, FOR SOLUTION INTRAMUSCULAR; INTRAVENOUS at 12:09

## 2021-01-01 RX ADMIN — CEFEPIME 100 MILLIGRAM(S): 1 INJECTION, POWDER, FOR SOLUTION INTRAMUSCULAR; INTRAVENOUS at 22:25

## 2021-01-01 RX ADMIN — CEFEPIME 100 MILLIGRAM(S): 1 INJECTION, POWDER, FOR SOLUTION INTRAMUSCULAR; INTRAVENOUS at 22:05

## 2021-01-01 RX ADMIN — Medication 50 MILLIGRAM(S): at 07:54

## 2021-01-01 RX ADMIN — CEFEPIME 100 MILLIGRAM(S): 1 INJECTION, POWDER, FOR SOLUTION INTRAMUSCULAR; INTRAVENOUS at 22:16

## 2021-01-01 RX ADMIN — CEFEPIME 100 MILLIGRAM(S): 1 INJECTION, POWDER, FOR SOLUTION INTRAMUSCULAR; INTRAVENOUS at 22:12

## 2021-01-01 RX ADMIN — CEFEPIME 1000 MILLIGRAM(S): 1 INJECTION, POWDER, FOR SOLUTION INTRAMUSCULAR; INTRAVENOUS at 22:55

## 2021-01-01 RX ADMIN — Medication 1000 MILLIGRAM(S): at 00:10

## 2021-01-01 RX ADMIN — SODIUM CHLORIDE 1000 MILLILITER(S): 9 INJECTION INTRAMUSCULAR; INTRAVENOUS; SUBCUTANEOUS at 22:50

## 2021-01-01 RX ADMIN — Medication 250 MILLIGRAM(S): at 16:55

## 2021-01-01 RX ADMIN — ENOXAPARIN SODIUM 40 MILLIGRAM(S): 100 INJECTION SUBCUTANEOUS at 12:09

## 2021-01-01 RX ADMIN — Medication 650 MILLIGRAM(S): at 22:30

## 2021-01-01 RX ADMIN — Medication 40 MILLIGRAM(S): at 17:26

## 2021-01-01 RX ADMIN — SODIUM CHLORIDE 1000 MILLILITER(S): 9 INJECTION INTRAMUSCULAR; INTRAVENOUS; SUBCUTANEOUS at 01:50

## 2021-01-01 RX ADMIN — SODIUM CHLORIDE 2000 MILLILITER(S): 9 INJECTION INTRAMUSCULAR; INTRAVENOUS; SUBCUTANEOUS at 22:20

## 2021-01-01 RX ADMIN — Medication 650 MILLIGRAM(S): at 23:00

## 2021-01-01 RX ADMIN — Medication 1: at 12:16

## 2021-01-01 RX ADMIN — CEFEPIME 100 MILLIGRAM(S): 1 INJECTION, POWDER, FOR SOLUTION INTRAMUSCULAR; INTRAVENOUS at 12:18

## 2021-01-01 RX ADMIN — Medication 40 MILLIGRAM(S): at 06:18

## 2021-01-01 RX ADMIN — SODIUM CHLORIDE 50 MILLILITER(S): 9 INJECTION, SOLUTION INTRAVENOUS at 12:23

## 2021-01-01 RX ADMIN — Medication 250 MILLIGRAM(S): at 23:10

## 2021-01-01 RX ADMIN — SODIUM CHLORIDE 1000 MILLILITER(S): 9 INJECTION INTRAMUSCULAR; INTRAVENOUS; SUBCUTANEOUS at 23:35

## 2021-01-01 RX ADMIN — CEFEPIME 100 MILLIGRAM(S): 1 INJECTION, POWDER, FOR SOLUTION INTRAMUSCULAR; INTRAVENOUS at 15:28

## 2021-01-01 RX ADMIN — SODIUM CHLORIDE 1000 MILLILITER(S): 9 INJECTION INTRAMUSCULAR; INTRAVENOUS; SUBCUTANEOUS at 22:35

## 2021-01-01 RX ADMIN — SODIUM CHLORIDE 1000 MILLILITER(S): 9 INJECTION INTRAMUSCULAR; INTRAVENOUS; SUBCUTANEOUS at 00:15

## 2021-01-01 RX ADMIN — MORPHINE SULFATE 1 MILLIGRAM(S): 50 CAPSULE, EXTENDED RELEASE ORAL at 13:42

## 2021-01-01 RX ADMIN — MORPHINE SULFATE 0.5 MILLIGRAM(S): 50 CAPSULE, EXTENDED RELEASE ORAL at 15:44

## 2021-07-07 NOTE — ED ADULT TRIAGE NOTE - CHIEF COMPLAINT QUOTE
Pt BIB EMS after family reported pt has "been sleeping for 2 days" and has not eaten.  Pt is reported to have dementia and according to family "has not spoke in years". Pt BIB EMS after family reported pt has "been sleeping for 2 days" and has not eaten.  Pt is reported to have dementia and according to family "has not spoke in years".  ISAR pos.  Pt has full time aid at home. Pt BIB EMS after family reported pt has "been sleeping for 2 days" and has not eaten.  Pt is reported to have dementia and according to family "has not spoke in years".  ISAR pos.  Pt has full time aid at home.  Family denies having any advanced directives.

## 2021-07-07 NOTE — PROVIDER CONTACT NOTE (EICU) - RECOMMENDATIONS
-empiric abx pending cultures, urine and respiratory cx   -IVF resuscitation as likely component of dehydration  -hyperkalemia medical tx, EKG  -trend lactate, monitor urine output   -f/u CT abd/pelvis, r/o hydronephrosis    Discussed with ER physician  ICU PA to evaluate pt and discuss

## 2021-07-07 NOTE — PROVIDER CONTACT NOTE (EICU) - BACKGROUND
89 y/o female with h/o dementia who is reportedly nonverbal. Sister is caretaker. Pt was brought in by EMS for h/o decreased po intake and increased lethargy. Febrile to 101. Abnormal CXR with right sided opacification with hypoxia  and hypotension initially prior to fluids. Lactic acidosis and K of 6.7.

## 2021-07-07 NOTE — PROVIDER CONTACT NOTE (EICU) - ASSESSMENT
PNA, possible aspiration, RML/RLL infiltrates  UTI  Hypotension  Hyperkalemia   Lactic acidosis  Full code

## 2021-07-07 NOTE — ED PROVIDER NOTE - CRITICAL CARE ATTENDING CONTRIBUTION TO CARE
Patient was critically ill with a high probability of imminent or life threatening deterioration.  direct patient care (not related to procedure), additional history taking, interpretation of diagnostic studies, documentation, consultation with other physicians, telephone consultation with the patient's family  I have personally and independently provided the amount of critical care time documented below excluding time spent on separate procedures.  40 mins

## 2021-07-07 NOTE — ED PROVIDER NOTE - CARE PLAN
Principal Discharge DX:	Sepsis  Secondary Diagnosis:	PNA (pneumonia)  Secondary Diagnosis:	UTI (urinary tract infection)

## 2021-07-07 NOTE — ED PROVIDER NOTE - OBJECTIVE STATEMENT
pt non-verbal from dementia    as per nephew, pt has been more lethargic over the last 1 week, not eating much, in bed most of the time and today noticed that she wasn't breathing well.  pt is not on any medications, has no medical problems but does not follow regularly with a doctor.

## 2021-07-07 NOTE — ED PROVIDER NOTE - PHYSICAL EXAMINATION
Gen:  alert, awake, mild respiratory distress  HEENT:  atraumatic head, airway clear, pupils equal and round  CV:  rrr, nl S1, S2, no m/r/g  Pulm:  lungs with absent breath sounds on R base  Abd: s/nt/nd, +BS  Ext:  moving all extremities but globally weak  Neuro:  grossly intact, no focal deficits  Skin:  clear, dry, intact

## 2021-07-07 NOTE — ED ADULT TRIAGE NOTE - CCCP TRG CHIEF CMPLNT
Plan: Treat the warts that don’t go away from freezing with Aldara. Detail Level: Detailed weakness hypotension/weakness

## 2021-07-08 NOTE — PROCEDURE NOTE - NSPROCDETAILS_GEN_ALL_CORE
location identified, draped/prepped, sterile technique used, needle inserted/introduced/positive blood return obtained via catheter/all materials/supplies accounted for at end of procedure

## 2021-07-08 NOTE — DIETITIAN INITIAL EVALUATION ADULT. - OTHER INFO
89 yo f pmhx dementia, nonverbal but physically capable (climbs stairs), DM and HLD admitted with Hypoxic/Hypercapnic Respiratory Failure , Multifocal Pneumonia, Severe Sepsis, UTI &  New afib. Patient NPO on BIPAP , No edema , No BM since PTA, Stage I on sacrum . NFPE conducted, evidence of severe protein calorie malnutrition noted. Patient DNR/DNI , comfort measures asper family wishes .would suggest conservative diet as medically indicated, pureed with thin liquids & po supplements as medically appropriate.

## 2021-07-08 NOTE — ED ADULT NURSE NOTE - CHIEF COMPLAINT QUOTE
Pt BIB EMS after family reported pt has "been sleeping for 2 days" and has not eaten.  Pt is reported to have dementia and according to family "has not spoke in years".  ISAR pos.  Pt has full time aid at home.  Family denies having any advanced directives.

## 2021-07-08 NOTE — PROGRESS NOTE ADULT - SUBJECTIVE AND OBJECTIVE BOX
Addendum to H&P/ICU Consult note  - Patient seen and examined today    ICU CONSULT  Location of Patient :  [facility] [id number] ( [facility])  Attending requesting Consult:Kye Patiño  Chief Complaint :     Reason For consult :      Initial HPI on admission:  HPI:  89 year old female with h/o dementia- non verbal, DM2, High chol who was brought in from home for increasing weakness.   Per patient's great nephew, patient with decline over the last week; weakness, poor po intake, did not get out of bed much.  In ED patient found to be hypotensive and hypoxic.  Patient given 1L NS, vanco/cefepime, placed on NRB.  Lab significant for leukocytosis, hyperkalemia (*moderately hemolyzed), lactate 2.1, UA +. CT chest with multifocal pneumonia (official read pending). ABG obtained 7.24/72/82/32. Patient placed on bipap.  Admit to CCU.  (08 Jul 2021 00:28)      BRIEF HOSPITAL COURSE:   this am o2 requirements down to 30%  respiratory status improved  but arousable to touch, but unable to provide history or ROS    as per record family had wished for DNR/I supportive care.    PAST MEDICAL & SURGICAL HISTORY:  No pertinent past medical history    No significant past surgical history      Allergies    penicillin (Unknown)  tetracycline (Unknown)    Intolerances      FAMILY HISTORY:    Social history: Social History:  From home, lives with home health aide.  nonverbal, needs help with feeds, but other wise physically capable with some assist. (08 Jul 2021 00:28)    Due to current medical status patient unable to provide medical, social, family history.  History obtained from available medical record.       Medications:  MEDICATIONS  (STANDING):  ALBUTerol    90 MICROgram(s) HFA Inhaler 1 Puff(s) Inhalation every 4 hours  cefepime   IVPB 1000 milliGRAM(s) IV Intermittent every 12 hours  enoxaparin Injectable 40 milliGRAM(s) SubCutaneous daily  hydrocortisone sodium succinate Injectable 50 milliGRAM(s) IV Push every 8 hours  vancomycin  IVPB 750 milliGRAM(s) IV Intermittent daily    MEDICATIONS  (PRN):      Home Medications:  Last Order Reconciliation Date: Not Done  aspirin 81 mg oral delayed release tablet: 1 tab(s) orally once a day (03-19-20)  Centrum Silver oral tablet: 1 tab(s) orally once a day (03-19-20)  metFORMIN 500 mg oral tablet: 1  orally once a day (03-19-20)  pravastatin 10 mg oral tablet: 1 tab(s) orally once M, w,F (03-19-20)  Vitamin D3 2000 intl units (50 mcg) oral tablet: tab(s) orally once a day (03-19-20)        LABS:                        11.3   12.85 )-----------( 191      ( 07 Jul 2021 22:10 )             35.3     07-08    135  |  101  |  61<H>  ----------------------------<  211<H>  5.4<H>   |  26  |  1.34<H>    Ca    7.8<L>      08 Jul 2021 01:06  Phos  5.8     07-08  Mg     2.7     07-08    TPro  6.9  /  Alb  2.6<L>  /  TBili  0.4  /  DBili  x   /  AST  338<H>  /  ALT  326<H>  /  AlkPhos  94  07-08    COVID  07-07-21 @ 22:10  COVID -   NotDetec         Trend Cardiac Enzymes  07-07-21 @ 23:40  MFD-KOBCI-JHBCO-CPKMM/Trop I - 294<H> - --  - --  -  --  /  --  07-07-21 @ 22:10  VJE-TGDTB-VFOVI-CPKMM/Trop I - -- - --  - --  -  --  /  .193<H>     WBC Trend  07-07-21 @ 22:10   -  12.85<H>    H/H Trend  07-07-21 @ 22:10   -   11.3<L>/ 35.3    Stool Occult Blood    Platelet Trend  07-07-21 @ 22:10   -  191    Trend Sodium  07-08-21 @ 01:06   -  135  07-07-21 @ 22:10   -  137    Trend Potassium  07-08-21 @ 01:06   -  5.4<H>  07-07-21 @ 22:10   -  6.7<HH>    Trend Bun/Cr  07-08-21 @ 01:06  BUN/CR -  61<H> / 1.34<H>  07-07-21 @ 22:10  BUN/CR -  66<H> / 1.30    Lactic Acid Trend  07-08-21 @ 01:06   -   1.6  07-07-21 @ 22:10   -   2.1<H>    ABG Trend  07-08-21 @ 05:00   - 7.22<L>/61<H>/151<H>/98<H>  07-08-21 @ 01:30   - 7.15<LL>/94<HH>/274<H>/98<H>  07-07-21 @ 23:54   - 7.24<L>/72<HH>/82/93    Trend AST/ALT/ALK Phos/Bili  07-08-21 @ 01:06   338<H>/326<H>/94/0.4  07-07-21 @ 22:10   355<H>/330<H>/96/0.9         Albumin Trend  07-08-21 @ 01:06   -   2.6<L>  07-07-21 @ 22:10   -   2.9<L>        Glucose Trend  07-08-21 @ 07:24   -  -- -- 130<H>  07-08-21 @ 01:06   -  -- 211<H> --  07-07-21 @ 22:10   -  -- 178<H> --         Rapid RVP Result: NotDetec (07-07-21 @ 22:10)      ABG - ( 08 Jul 2021 05:00 )  pH, Arterial: 7.22  pH, Blood: x     /  pCO2: 61    /  pO2: 151   / HCO3: x     / Base Excess: x     /  SaO2: 98               RADIOLOGY  CT:  < from: CT Chest No Cont (07.07.21 @ 23:34) >  EXAM:  CT ABDOMEN AND PELVIS    EXAM:  CT CHEST      PROCEDURE DATE:  07/07/2021        INTERPRETATION:  CLINICAL INFORMATION: Fever, shortness of breath, sepsis.    COMPARISON: 8/30/2012 chest CT    CONTRAST/COMPLICATIONS:  IV Contrast: None  OralContrast: None  Complications: None    PROCEDURE:  CT of the Chest, Abdomen and Pelvis was performed.  Sagittal and coronal reformats were performed.    FINDINGS:  Evaluation of solid organs is limited without intravenous contrast.    CHEST:  LUNGS AND LARGE AIRWAYS: Patent central airways. Partial right middle lobe and bilateral lower lobe atelectasis related to effusions and elevated right hemidiaphragm.    PLEURA: Moderate left pleural effusion. Loculated small to moderate right pleural effusion.  VESSELS: Atherosclerotic changes of the aorta and coronary arteries.  HEART: Cardiomegaly. No pericardial effusion.  MEDIASTINUM AND NAVARRO: No lymphadenopathy. Nonspecific small fluid in the right cardiophrenic region (2, 52).  CHEST WALL AND LOWER NECK: Heterogeneous enlarged multinodular thyroid. 6.6 x 4.4 cm heterogeneous mass with internal calcifications in the mediastinum not significantly changed since prior exam and noted to connect to the right thyroid lobe, most compatible with large substernal goiter.    ABDOMEN AND PELVIS:  LIVER: Within normal limits.  BILE DUCTS: Normal caliber.  GALLBLADDER: Cholelithiasis.  SPLEEN: Within normal limits.  PANCREAS: Atrophic.  ADRENALS: Within normal limits.  KIDNEYS/URETERS: No hydronephrosis. Indeterminate hypodense 2.1 cm left renal lesion (2, 83).    BLADDER: Within normal limits.  REPRODUCTIVE ORGANS: Uterus and adnexa within normal limits.    BOWEL: No bowel obstruction. Appendix is normal. Colonic diverticulosis. Mildly distended stomach.  PERITONEUM: Trace perihepatic ascites  VESSELS: Atherosclerotic changes.  RETROPERITONEUM/LYMPH NODES: No lymphadenopathy.  ABDOMINAL WALL: Within normal limits.  BONES: Degenerative changes. Mild L4 compression deformity. Chronic right-sided rib deformities. Absent right humeral head.    IMPRESSION:  Limited exam without intravenous contrast.  Small to moderate loculated right pleural effusion and moderate left pleural effusion with associated bilateral areas of atelectasis.    No acute intra-abdominal findings within the limitations of noncontrast technique.    Indeterminate hypodense 2.1 cm left renal lesion can be further evaluated on follow-up ultrasound.    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        --- End of Report ---      < end of copied text >    ECHO:      < from: CT Head No Cont (07.07.21 @ 23:00) >  IMPRESSION:  Stable exam. No acute intracranial hemorrhage, vasogenic edema, hydrocephalus or extra-axial collection. Chronic findings as above.      < end of copied text >  VITALS:  T(C): 37.1 (07-08-21 @ 08:00), Max: 38.3 (07-07-21 @ 22:10)  T(F): 98.8 (07-08-21 @ 08:00), Max: 101 (07-07-21 @ 22:10)  HR: 71 (07-08-21 @ 10:00) (57 - 88)  BP: 96/48 (07-08-21 @ 10:00) (77/41 - 96/62)  BP(mean): 63 (07-08-21 @ 10:00) (49 - 79)  ABP: --  ABP(mean): --  RR: 0 (07-08-21 @ 10:00) (0 - 22)  SpO2: 100% (07-08-21 @ 10:00) (94% - 100%)  CVP(mm Hg): --  CVP(cm H2O): --    Ins and Outs     07-07-21 @ 07:01  -  07-08-21 @ 07:00  --------------------------------------------------------  IN: 0 mL / OUT: 0 mL / NET: 0 mL        Height (cm): 152.4 (07-07-21 @ 22:01)  Weight (kg): 43.1 (07-07-21 @ 22:01)  BMI (kg/m2): 18.6 (07-07-21 @ 22:01)        I&O's Detail    07 Jul 2021 07:01  -  08 Jul 2021 07:00  --------------------------------------------------------  IN:  Total IN: 0 mL    OUT:    Voided (mL): 0 mL  Total OUT: 0 mL    Total NET: 0 mL

## 2021-07-08 NOTE — H&P ADULT - NSHPSOCIALHISTORY_GEN_ALL_CORE
From home, lives with home health aide.  nonverbal, needs help with feeds, but other wise physically capable with some assist.

## 2021-07-08 NOTE — H&P ADULT - HISTORY OF PRESENT ILLNESS
89 yo f pmhx dementia, nonverbal but physically capable (climbs stairs), DM and HLD biba from home with complaints of weakness.  Per patient's great nephew, patient with decline over the last week; weakness, poor po intake, did not get out of bed much.  In ED patient found to be hypotensive and hypoxic.  Patient given 1L NS, vanco/cefepime, placed on NRB.  Lab significant for leukocytosis, hyperkalemia (*moderately hemolyzed), lactate 2.1, UA +. CT chest with multifocal pneumonia (official read pending). ABG obtained 7.24/72/82/32. Patient placed on bipap.  Admit to CCU.

## 2021-07-08 NOTE — PATIENT PROFILE ADULT - NSPRESCRUSEDDRG_GEN_A_NUR
Preoperative Exam    Scheduled Procedure: Left Breast Biopsy   Surgery Date:  08/09/2019  Surgery Location: , fax [phone]    Surgeon:  Dr. Danielle    Assessment/Plan:     1. Persistent insomnia  Increase seroquel from 12.5 to 25mg nightly  - QUEtiapine (SEROQUEL) 25 MG tablet; Take 1 tab PO at bedtime  Dispense: 90 tablet; Refill: 4    2. Breast mass  Biopsy needed. I have prescribed extra pain medication for post-procedure  - LORazepam (ATIVAN) 1 MG tablet; Take 1 tablet (1 mg total) by mouth every 8 (eight) hours as needed for anxiety.  Dispense: 90 tablet; Refill: 0  - oxyCODONE (ROXICODONE) 5 MG immediate release tablet; Take 1-3 tabs PO every 6 hours as needed for pain  Dispense: 20 tablet; Refill: 0  - HM1(CBC and Differential)  - Basic Metabolic Panel  - HM1 (CBC with Diff)  - Urinalysis-UC if Indicated  - Culture, Urine    3. Elevated ferritin  Recheck - to see if it as actually elevated  - Ferritin    4. Pre-op exam  Approved for procedure  - HM1(CBC and Differential)  - Basic Metabolic Panel  - HM1 (CBC with Diff)  - Urinalysis-UC if Indicated  - Culture, Urine    Surgical Procedure Risk: Intermediate (reported cardiac risk generally 1-5%)  Have you had prior anesthesia?: Yes  Have you or any family members had a previous anesthesia reaction:  Yes: nausea and vomiting the first time.  Do you or any family members have a history of a clotting or bleeding disorder?: No  Cardiac Risk Assessment: no increased risk for major cardiac complications    APPROVAL GIVEN to proceed with proposed procedure, without further diagnostic evaluation    Please Note: given her h/o cancer, she is quite worried about the results from this biopsy. Just note that.    Functional Status: Independent  Patient plans to recover at home with family.         Subjective:      Alexus Garcia is a 63 y.o. female who presents for a preoperative consultation.  She has a left breast lump increasing in size which requires  biopsy, especially given her h/o breast cancer. She is very afraid this is recurrent cancer.    All other systems reviewed and are negative, other than those listed in the HPI.    Pertinent History  Do you have difficulty breathing or chest pain after walking up a flight of stairs: No  History of obstructive sleep apnea: unknown  Steroid use in the last 6 months: No  Frequent Aspirin/NSAID use: No  Prior Blood Transfusion: Yes: before  Prior Blood Transfusion Reaction: No  If for some reason prior to, during or after the procedure, if it is medically indicated, would you be willing to have a blood transfusion?:  There is no transfusion refusal.    Current Outpatient Medications   Medication Sig Dispense Refill     DULoxetine (CYMBALTA) 30 MG capsule TAKE 1 CAPSULE BY MOUTH EVERY DAY 90 capsule 0     gabapentin (NEURONTIN) 400 MG capsule TAKE 1 CAPSULE (400 MG TOTAL) BY MOUTH 3 (THREE) TIMES A DAY. 270 capsule 3     hydrOXYzine HCl (ATARAX) 25 MG tablet Take 25 mg by mouth.       melatonin 10 mg Tab Take 10 mg by mouth at bedtime.       metFORMIN (GLUCOPHAGE-XR) 500 MG 24 hr tablet Take 500 mg by mouth.       zolpidem (AMBIEN) 10 mg tablet Take 1 tablet (10 mg total) by mouth at bedtime as needed for sleep. 90 tablet 0     acetaminophen (TYLENOL) 500 MG tablet Take 1,000 mg by mouth 3 (three) times a day as needed for pain.       aspirin-acetaminophen-caffeine (EXCEDRIN MIGRAINE) 250-250-65 mg per tablet Take 2 tablets by mouth every 8 (eight) hours as needed for pain (headaches).       betamethasone, augmented, (DIPROLENE) 0.05 % lotion Apply 1 application topically 2 (two) times a day as needed. 60 mL 3     blood glucose test strips Use 1 each As Directed as needed. Dispense brand per patient's insurance at pharmacy discretion. 100 strip 2     calcium citrate-vitamin D (CITRACAL+D) 315-200 mg-unit per tablet Take 1 tablet by mouth.       cholecalciferol, vitamin D3, 1,000 unit tablet Take 2,000 Units by mouth 2  (two) times a day.       generic lancets Use 1 each As Directed as needed. Dispense brand per patient's insurance at pharmacy discretion. 100 each 4     LORazepam (ATIVAN) 1 MG tablet Take 1 tablet (1 mg total) by mouth every 8 (eight) hours as needed for anxiety. 90 tablet 0     multivitamin therapeutic w/minerals (THERAPEUTIC-M) 27-0.4 mg Tab tablet Take 100 capsules by mouth.       naproxen sodium (ALEVE) 220 MG tablet Take 440 mg by mouth as needed.       ONETOUCH DELICA LANCETS 30 gauge Misc Use 1 each As Directed as needed. DISPENSE BRAND PER PATIENT'S INSURANCE AT PHARMACY DISCRETION.  4     ONETOUCH ULTRA2 USE DAILY AS DIRECTED  0     oxyCODONE (ROXICODONE) 5 MG immediate release tablet Take 1-3 tabs PO every 6 hours as needed for pain 20 tablet 0     polyethylene glycol (MIRALAX) 17 gram packet Take 17 g by mouth.       QUEtiapine (SEROQUEL) 25 MG tablet Take 1 tab PO at bedtime 90 tablet 4     QUEtiapine (SEROQUEL) 25 MG tablet Take 1 tablet by mouth at bedtime.       No current facility-administered medications for this visit.         Allergies   Allergen Reactions     Aztreonam Hives     Castor Oil Hives     Cefaclor Anaphylaxis     Cephalexin Anaphylaxis     Cephalexin Monohydrate Hives     Chlorhexidine Hives     White clear stuff that you lather during surgery that dries unto the skin and stays on the skin that's almost like a superglue. It was very itchy and skin gets very red.     Ciprofloxacin Anaphylaxis     Clarithromycin Anaphylaxis, Anxiety, Dizziness, Hives, Itching, Nausea And Vomiting, Rash and Shortness Of Breath     Clindamycin Hives     Penicillins Anaphylaxis     Propofol Nausea Only and Headache     Scopolamine Anaphylaxis, Hives and Swelling     Swollen tongue, eye swelled closed  Eye and tongue swelling      Sulfa (Sulfonamide Antibiotics) Anaphylaxis and Hives     Sulfasalazine Hives     Tetracyclines Anaphylaxis     Vancomycin Hives     Adhesive Itching     Irritation where the tape  had contact     Cytoxan [Cyclophosphamide] Hives     severe     Erythromycin Base Hives     Hydrocodone Nausea And Vomiting     Neupogen [Filgrastim] Hives     severe     Paper Tape Other (See Comments)     Surgical paper tape - redness. States if left on too long leaves little cuts on her skin     Tapentadol Other (See Comments)     bleeding     Taxol [Paclitaxel] Hives     severe     Taxotere [Docetaxel] Hives       Patient Active Problem List   Diagnosis     Neuralgia     Malignant neoplasm of upper-inner quadrant of right female breast (H)     Malignant neoplasm of breast (H)     Posttraumatic hematoma of right breast     Postoperative pain     Pain in right axilla     Constipation due to outlet dysfunction     Logorrhea     Diabetes mellitus (H)     Rotator cuff tear     Anxiety     Scalp irritation     Abnormal MRI     Diabetes 1.5, managed as type 2 (H)     H/O breast surgery     History of total mastectomy of right breast       Past Medical History:   Diagnosis Date     Anxiety      Arthritis      Breast cancer (H) 2011    left     Breast cancer (H) 2016    right     Colon cancer screening 2018    FIT test for blood; negative     Depression      Diabetes mellitus (H)      History of transfusion      Hx antineoplastic chemotherapy 2011    left     Hx of radiation therapy 2011    left     Lymphedema     left chest wall and left arm     Neuropathy     left upper torso and chest     PONV (postoperative nausea and vomiting)      Prediabetes      Rectocele      Urination disorder     has to manually push to get urine out       Past Surgical History:   Procedure Laterality Date     AUGMENTATION MAMMAPLASTY Left 2013    TRAM flap with implant     AUGMENTATION MAMMAPLASTY Right 2013    implant placed     BREAST BIOPSY Left 2011     BREAST BIOPSY Right 2016     BREAST IMPLANT REMOVAL Right 9/20/2016    Procedure: EVACUATION RIGHT BREAST HEMATOMA;  Surgeon: Josr Alcantara MD;  Location: [facility];  Service:       MASTECTOMY Right 2016    reconstructive expanders bilaterally     FL ARTHRODESIS ANT INTERBODY MIN DISCECTOMY,LUMBAR      Description: Lumbar Vertebral Fusion;  Recorded: 09/25/2013;     FL BREAST RECONSTRUC W LAT DORSI FLAP      Description: Breast Surgery Reconstruction With Latissimus Dorsi Flap;  Recorded: 09/25/2013;     FL BREAST RECONSTRUC W TISS EXPANDR Bilateral 8/18/2016    Procedure: BILATERAL BREAST RECONSTRUCTION, REMOVAL OF LESION RIGHT AXILLA, REMOVAL OF LESION RIGHT NECK;  Surgeon: Josr Alcantara MD;  Location: [facility];  Service: Plastics     FL EXCISE BREAST CYST Left 2011    Description: Breast Surgery Lumpectomy;  Recorded: 09/25/2013;     FL EXCISE EXCESS SKIN TISSUE,ABDOMEN N/A 3/3/2017    Procedure: BILATERAL FAT GRAFTING FROM ABDOMEN TO BREASTS;  Surgeon: Josr Alcantara MD;  Location: [facility];  Service: Plastics     FL LAP,DIAGNOSTIC ABDOMEN      Description: Laparoscopy (Diagnostic);  Recorded: 09/25/2013;     FL MASTECTOMY, SIMPLE, COMPLETE Right 8/18/2016    Procedure: RIGHT MASTECTOMY;  Surgeon: Veronique Danielle MD;  Location: [facility];  Service: General     FL REPLACE TISSUE EXPANDER Bilateral 3/3/2017    Procedure: BILATERAL TISSUE EXPANDER REMOVAL FOR PERMANENT IMPLANTS;  Surgeon: Josr Alcantara MD;  Location: [facility];  Service: Plastics       Social History     Socioeconomic History     Marital status: Single     Spouse name: Not on file     Number of children: Not on file     Years of education: Not on file     Highest education level: Not on file   Occupational History     Not on file   Social Needs     Financial resource strain: Not on file     Food insecurity:     Worry: Not on file     Inability: Not on file     Transportation needs:     Medical: Not on file     Non-medical: Not on file   Tobacco Use     Smoking status: Former Smoker     Packs/day: 1.00     Types: Cigarettes     Last attempt to quit: 5/3/2011     Years since quitting:  "8.2     Smokeless tobacco: Never Used     Tobacco comment: No passive exposure   Substance and Sexual Activity     Alcohol use: No     Drug use: No     Sexual activity: Not on file   Lifestyle     Physical activity:     Days per week: Not on file     Minutes per session: Not on file     Stress: Not on file   Relationships     Social connections:     Talks on phone: Not on file     Gets together: Not on file     Attends Mu-ism service: Not on file     Active member of club or organization: Not on file     Attends meetings of clubs or organizations: Not on file     Relationship status: Not on file     Intimate partner violence:     Fear of current or ex partner: Not on file     Emotionally abused: Not on file     Physically abused: Not on file     Forced sexual activity: Not on file   Other Topics Concern     Not on file   Social History Narrative     Not on file         Objective:     Vitals:    08/07/19 1052   BP: 112/74   Pulse: (!) 115   Resp: 18   Temp: 98.5  F (36.9  C)   TempSrc: Oral   SpO2: 94%   Weight: 143 lb 2 oz (64.9 kg)   Height: 5' 2.8\" (1.595 m)   LMP: 09/16/2006       Physical Exam:  General Appearance: Alert, cooperative, no distress but worried, appears stated age  Head: Normocephalic, without obvious abnormality, atraumatic  Eyes: PERRL, conjunctiva/corneas clear, EOM's intact  Ears: TM's clear and retracted,external ear canals with mild amount of cerumen, both ears  Nose: Nares clear, septum midline,mucosa pink and moist, no drainage  Throat: Lips, mucosa, and tongue moist without lesions; teeth clean  Neck: Supple, symmetrical, trachea midline, no adenopathy  Breasts/chest: not examined - per surgeon; known to have many surgical scars  Lungs: Clear to auscultation bilaterally, respirations unlabored  Heart: Regular rate and rhythm, S1 and S2 normal, no murmur  Abdomen: Soft, non-tender, bowel sounds active,  no masses, no organomegaly  Extremities: Extremities have symmetric bulk and tone, " atraumatic  Skin: no rashes or lesions, warm  Neurologic: smooth coordination during exam, +2/4 DTRs in patellar tendons      There are no Patient Instructions on file for this visit.    EKG:  From 2016 NSR, no abnormalities    Labs:  Recent Results (from the past 48 hour(s))   Urinalysis-UC if Indicated    Collection Time: 08/07/19 11:42 AM   Result Value Ref Range    Color, UA Yellow Colorless, Yellow, Straw, Light Yellow    Clarity, UA Cloudy (!) Clear    Glucose, UA Negative Negative    Bilirubin, UA Negative Negative    Ketones, UA Trace (!) Negative    Specific Gravity, UA 1.012 1.001 - 1.030    Blood, UA Negative Negative    pH, UA 5.5 4.5 - 8.0    Protein, UA Trace (!) Negative mg/dL    Urobilinogen, UA <2.0 E.U./dL <2.0 E.U./dL, 2.0 E.U./dL    Nitrite, UA Negative Negative    Leukocytes, UA Small (!) Negative    Bacteria, UA Few (!) None Seen hpf    RBC, UA 0-2 None Seen, 0-2 hpf    WBC, UA 5-10 (!) None Seen, 0-5 hpf    Squam Epithel, UA 0-5 None Seen, 0-5 lpf    Trans Epithel, UA 0-5 (!) None Seen lpf    Mucus, UA Many (!) None Seen lpf    Hyaline Casts, UA 25-50 (!) 0-5, None Seen lpf   Culture, Urine    Collection Time: 08/07/19 11:42 AM   Result Value Ref Range    Culture No Growth    Basic Metabolic Panel    Collection Time: 08/07/19 11:43 AM   Result Value Ref Range    Sodium 136 136 - 145 mmol/L    Potassium 3.9 3.5 - 5.0 mmol/L    Chloride 104 98 - 107 mmol/L    CO2 19 (L) 22 - 31 mmol/L    Anion Gap, Calculation 13 5 - 18 mmol/L    Glucose 106 70 - 125 mg/dL    Calcium 10.1 8.5 - 10.5 mg/dL    BUN 14 8 - 22 mg/dL    Creatinine 0.94 0.60 - 1.10 mg/dL    GFR MDRD Af Amer >60 >60 mL/min/1.73m2    GFR MDRD Non Af Amer 60 (L) >60 mL/min/1.73m2   Ferritin    Collection Time: 08/07/19 11:43 AM   Result Value Ref Range    Ferritin 98 10 - 130 ng/mL   HM1 (CBC with Diff)    Collection Time: 08/07/19 11:43 AM   Result Value Ref Range    WBC 7.5 4.0 - 11.0 thou/uL    RBC 4.92 3.80 - 5.40 mill/uL     Hemoglobin 14.5 12.0 - 16.0 g/dL    Hematocrit 43.3 35.0 - 47.0 %    MCV 88 80 - 100 fL    MCH 29.4 27.0 - 34.0 pg    MCHC 33.4 32.0 - 36.0 g/dL    RDW 11.4 11.0 - 14.5 %    Platelets 337 140 - 440 thou/uL    MPV 7.4 7.0 - 10.0 fL    Neutrophils % 58 50 - 70 %    Lymphocytes % 32 20 - 40 %    Monocytes % 9 2 - 10 %    Eosinophils % 2 0 - 6 %    Basophils % 1 0 - 2 %    Neutrophils Absolute 4.3 2.0 - 7.7 thou/uL    Lymphocytes Absolute 2.4 0.8 - 4.4 thou/uL    Monocytes Absolute 0.7 0.0 - 0.9 thou/uL    Eosinophils Absolute 0.1 0.0 - 0.4 thou/uL    Basophils Absolute 0.0 0.0 - 0.2 thou/uL        Immunization History   Administered Date(s) Administered     Influenza high dose, seasonal 09/18/2018     Influenza, inj, historic,unspecified 09/22/2016     Influenza,seasonal quad, PF, 36+MOS 11/13/2015, 09/22/2016, 10/11/2017     Tdap 01/13/2017           Electronically signed by Daxa Morales MD 08/08/19 10:55 AM   pt non verbal/on BIPAP

## 2021-07-08 NOTE — CONSULT NOTE ADULT - CONVERSATION DETAILS
Called and spoke to pts nancy Olivarez , who reports pt lives in same house with pt. Niece reports that pt has been declining past few weeks, with less mobility and less eating noted. Pts baseline is non verbal per family. They do have some help at home w/ a private hire aide.  Confirmed and reviewed with niece that pt is to be DNR/DNI. I also reviewed alternate means of nutrition with niece who stated she would not want that ,  no feeding tubes if eating becomes an issue.  Family stated they do not want to prolong any suffering, but wish for pt to be comfortable.   I began discussion of hospice services, explained home vs inpatient services. I explained at this point,  pt would be more appropriate for home hospice services, but this may change depending on how pt does in next few days. Nancy was interested in hospice, but also needed to discuss with her family first, so no decisions made in regards to hospice at this time.  She was thankful for the information.

## 2021-07-08 NOTE — CONSULT NOTE ADULT - ASSESSMENT
A/P   89 yo f pmhx dementia, nonverbal but physically capable (climbs stairs), DM and HLD biba from home with complaints of weakness.  Per chart and patient's great nephew, patient with decline over the last week; weakness, poor po  In ED patient found to be hypotensive and hypoxic.  Patient given 1L NS, vanco/cefepime, placed on NRB.  Lab significant for leukocytosis, hyperkalemia (*moderately hemolyzed), lactate 2.1, UA +. CT chest with multifocal pneumonia (official read pending). ABG obtained 7.24/72/82/32. Patient placed on bipap.  Admitted to CCU.     Per ccu:  Problem List   Acute hypercarbic respiratory failure   Suspected aspiration PNA    unsure of underlying COPD/Smoking status at this time   Abnormal CT chest with b/l pl effusion, Moderate left pleural effusion. Loculated small to moderate right pleural effusion.   Abnormal CT Chest - Heterogeneous enlarged multinodular thyroid. 6.6 x 4.4 cm heterogeneous mass with internal calcifications in the mediastinum not significantly changed since prior exam and noted to connect to the right thyroid lobe, most compatible with large substernal goiter.     possible underlying infection    Underlying Dementia    DM2, high chol, h/o breast cancer       Plan  Taper NIV towards off  steroids, nebs , antibiotics, bronchodilators  NPO for now, advance diet when able  Insulin SS  lasix x 1  Palliative care eval  S&S eval prior to advance diet       Palliative : asked for [facility] assist , case reviewed w/ ccu team this AM, chart reviewed and pt seen bedside. Pt was unarousable, non verbal w/ bi-pap in use.   I called and had lengthy conversation w/ pts nimame Olivarez who pt lives with . See GOC note above.  Family's main wish is for pt to be comfortable, and no extraordinary measures are to be taken.   We confirmed DNR/DNI status, and reviewed nutritional status, family says No to feeding tubes.   Reviewed/explained hospice services to family , home vs inpatient.  Will depend on pts clinical course, no decisions on hospice have been made at this time.    Family appreciative of the information. Niece given my number for questions  will cont to follow w/ ccu team.    A/P   89 yo f pmhx dementia, nonverbal but physically capable (climbs stairs), DM and HLD biba from home with complaints of weakness.  Per chart and patient's great nephew, patient with decline over the last week; weakness, poor po  In ED patient found to be hypotensive and hypoxic.  Patient given 1L NS, vanco/cefepime, placed on NRB.  Lab significant for leukocytosis, hyperkalemia (*moderately hemolyzed), lactate 2.1, UA +. CT chest with multifocal pneumonia (official read pending). ABG obtained 7.24/72/82/32. Patient placed on bipap.  Admitted to CCU.     Per ccu:  Problem List   Acute hypercarbic respiratory failure   Suspected aspiration PNA    unsure of underlying COPD/Smoking status at this time   Abnormal CT chest with b/l pl effusion, Moderate left pleural effusion. Loculated small to moderate right pleural effusion.   Abnormal CT Chest - Heterogeneous enlarged multinodular thyroid. 6.6 x 4.4 cm heterogeneous mass with internal calcifications in the mediastinum not significantly changed since prior exam and noted to connect to the right thyroid lobe, most compatible with large substernal goiter.     possible underlying infection    Underlying Dementia    DM2, high chol, h/o breast cancer       Plan  Taper NIV towards off  steroids, nebs , antibiotics, bronchodilators  NPO for now, advance diet when able  Insulin SS  lasix x 1  Palliative care eval  S&S eval prior to advance diet       Palliative : asked for [facility] assist , case reviewed w/ ccu team this AM, chart reviewed and pt seen bedside. Pt was unarousable, non verbal w/ bi-pap in use.   I called and had lengthy conversation w/ pts nimame Olivarez who pt lives with . See GOC note above.  Family's main wish is for pt to be comfortable, and no extraordinary measures are to be taken.   We confirmed DNR/DNI status, and reviewed nutritional status, family says No to feeding tubes.   Reviewed/explained hospice services to family , home vs inpatient.  Will depend on pts clinical course, no decisions on hospice have been made at this time.    Family appreciative of the information. Niece given my number for questions  Hospice consult placed today  for informational meeting only , d/w CM   will cont to follow w/ ccu team.

## 2021-07-08 NOTE — DIETITIAN INITIAL EVALUATION ADULT. - PERTINENT LABORATORY DATA
Date: 07 Jul 2021 22:10  Hemoglobin 11.3   Hematocrit 35.3     Date: 07-08  Sodium 135  Potassium 5.4<H>  Glucose Serum 211<H>  BUN 61<H>  Creatinine 1.34    ACCUCHECK  POCT Blood Glucose.: 130 mg/dL (08 Jul 2021 07:24)

## 2021-07-08 NOTE — ED ADULT NURSE NOTE - OBJECTIVE STATEMENT
Pt is non verbal , who was brought to the ER from home due to weakness and hypotension. As per great nephew at the bedside, pt has not been eating well for the last week or so, denies any medical problem or any daily medication. Pt lives with her Aide, and her niece on the upper apartment level.

## 2021-07-08 NOTE — GOALS OF CARE CONVERSATION - ADVANCED CARE PLANNING - CONVERSATION DETAILS
Spoke to Juanis Calderon (Nikki) and her son Grayson Calderon about patient's diagnosis/treatment plan.  We discussed possibilities of patient declining requiring mechanical ventilation and possible deterioration into cardiac arrest.  Dora and Grayson both endorse that patient is a DNR/I and that if she were to deteriorate that they would want her to be comfortable.  We discussed trial of bipap in setting of hypoxia/hypercapnic respiratory failure, they agree to trial.  We discussed role of vasopressor therapy/cvc placement, Dora "why would you want to do that, I don't want her to suffer" they endorse that they would not want vasopressor therapy/central line placement/invasive procedures.

## 2021-07-08 NOTE — PATIENT PROFILE ADULT - VISION (WITH CORRECTIVE LENSES IF THE PATIENT USUALLY WEARS THEM):
pt non verbal/on BIPAP/Partially impaired: cannot see medication labels or newsprint, but can see obstacles in path, and the surrounding layout; can count fingers at arm's length

## 2021-07-08 NOTE — DIETITIAN INITIAL EVALUATION ADULT. - PERTINENT MEDS FT
MEDICATIONS  (STANDING):  ALBUTerol    90 MICROgram(s) HFA Inhaler 1 Puff(s) Inhalation every 4 hours  cefepime   IVPB 1000 milliGRAM(s) IV Intermittent every 12 hours  enoxaparin Injectable 40 milliGRAM(s) SubCutaneous daily  hydrocortisone sodium succinate Injectable 50 milliGRAM(s) IV Push every 8 hours  vancomycin  IVPB 750 milliGRAM(s) IV Intermittent daily    MEDICATIONS  (PRN):

## 2021-07-08 NOTE — H&P ADULT - NSHPPHYSICALEXAM_GEN_ALL_CORE
GENERAL: Elderly female, lying in bed, NAD  HEENT: NC/AT, puplis 3-4mm equal, reactive, nrb in place  CV: irregular rate/rhythm  RESP: Symmetrical thorax expansion, coarse b/l   ABD: soft, nontender, nondistended, normoactive bowel sounds, no masses appreciated.    MSK: frail  EXT: no edema  SKIN: Warm, no rasehs appreciated  NEURO: Lethargic, moves all extremities

## 2021-07-08 NOTE — PHARMACOTHERAPY INTERVENTION NOTE - COMMENTS
recommended once daily dosing due to crcl of 18ml/min
cefepime - recommended 1 gram q12h to be scheduled starting at 11am due to crcl 18ml/min and timing based on ed administration  vancomycin - recommended 750mg daily due to crcl and ed administration as well, need to obtain trough

## 2021-07-08 NOTE — CONSULT NOTE ADULT - SUBJECTIVE AND OBJECTIVE BOX
staying w/ family HPI: 89 yo f pmhx dementia, per family, nonverbal but physically capable (climbs stairs), DM and HLD biba from home with complaints of weakness.  Per patient's great nephew, patient with decline over the last week; weakness, poor po intake, did not get out of bed much.  In ED patient found to be hypotensive and hypoxic.  Patient given 1L NS, vanco/cefepime, placed on NRB.  Lab significant for leukocytosis, hyperkalemia (*moderately hemolyzed), lactate 2.1, UA +. CT chest with multifocal pneumonia (official read pending). ABG obtained 7./82/32. Patient placed on bipap.  Admit to CCU.  (2021 00:28)      PAST MEDICAL & SURGICAL HISTORY:  No pertinent past medical history    No significant past surgical history        SOCIAL HISTORY:    Admitted from:  home    Substance abuse history:              Tobacco hx:                  Alcohol hx:              Home Opioid hx:  Mormon:                                    Preferred Language:    Surrogate/HCP/:           Niece : Juanis MONCADA  [phone]         FAMILY HISTORY:    Baseline ADLs (prior to admission):    Allergies    penicillin (Unknown)  tetracycline (Unknown)    Intolerances      Present Symptoms:   Dyspnea:   Nausea/Vomiting:   Anxiety:  Depressed   Fatigue:  Loss of appetite:   Pain:                                location:          Review of Systems:  Unable to obtain due to poor mentation/non-verbal    MEDICATIONS  (STANDING):  ALBUTerol    90 MICROgram(s) HFA Inhaler 1 Puff(s) Inhalation every 4 hours  cefepime   IVPB 1000 milliGRAM(s) IV Intermittent every 12 hours  dextrose 40% Gel 15 Gram(s) Oral once  dextrose 5%. 1000 milliLiter(s) (50 mL/Hr) IV Continuous <Continuous>  dextrose 5%. 1000 milliLiter(s) (100 mL/Hr) IV Continuous <Continuous>  dextrose 50% Injectable 25 Gram(s) IV Push once  dextrose 50% Injectable 12.5 Gram(s) IV Push once  dextrose 50% Injectable 25 Gram(s) IV Push once  enoxaparin Injectable 40 milliGRAM(s) SubCutaneous daily  furosemide   Injectable 40 milliGRAM(s) IV Push once  glucagon  Injectable 1 milliGRAM(s) IntraMuscular once  insulin lispro (ADMELOG) corrective regimen sliding scale   SubCutaneous at bedtime  methylPREDNISolone sodium succinate Injectable 40 milliGRAM(s) IV Push every 8 hours  vancomycin  IVPB 750 milliGRAM(s) IV Intermittent daily    MEDICATIONS  (PRN):      PHYSICAL EXAM:    Vital Signs Last 24 Hrs  T(C): 37.1 (2021 08:00), Max: 38.3 (2021 22:10)  T(F): 98.8 (2021 08:00), Max: 101 (2021 22:10)  HR: 71 (2021 10:00) (57 - 88)  BP: 96/48 (2021 10:00) (77/41 - 96/62)  BP(mean): 63 (2021 10:00) (49 - 79)  RR: 0 (2021 10:00) (0 - 22)  SpO2: 100% (2021 10:00) (94% - 100%)    General: lethargic   nonverbal  unarousable  Karnofsky Performance Score/Palliative Performance Status Version2:  10-20   %  PPSV: 10%  HEENT: normal  dry mouth  bi-pap in use   Lungs: coarse bs,   CV: normal rate  GI: normal abd soft, n/t + bs    : normal  incontinent  hol[place]  Musculoskeletal: normal  w/weakness,  trace pedal edema   Skin: normal  w/d, fragile , redness to toes   Neuro: + deficits , currently unresponsive, non verbal   Oral intake ability: unable w/ bi-pap  Diet: [NPO]- TBD     LABS:                        11.3   12.85 )-----------( 191      ( 2021 22:10 )             35.3     07-08    135  |  101  |  61<H>  ----------------------------<  211<H>  5.4<H>   |  26  |  1.34<H>    Ca    7.8<L>      2021 01:06  Phos  5.8     07-08  Mg     2.7     07-08    TPro  6.9  /  Alb  2.6<L>  /  TBili  0.4  /  DBili  x   /  AST  338<H>  /  ALT  326<H>  /  AlkPhos  94  07-08    Urinalysis Basic - ( 2021 22:10 )    Color: Yellow / Appearance: very cloudy / S.020 / pH: x  Gluc: x / Ketone: Trace  / Bili: Small / Urobili: 1   Blood: x / Protein: 100 / Nitrite: Negative   Leuk Esterase: Moderate / RBC: 5-10 /HPF / WBC >50 /HPF   Sq Epi: x / Non Sq Epi: Neg.-Few / Bacteria: Many /HPF        RADIOLOGY & ADDITIONAL STUDIES: < from: CT Head No Cont (21 @ 23:00) >  EXAM:  CT BRAIN      PROCEDURE DATE:  2021        INTERPRETATION:  CLINICAL HISTORY:  Altered mental status    TECHNIQUE:  CT of the head without contrast.  Contiguous transaxial images of the head were acquired from the skull base to the vertex without the administration of iodinated contrast. Coronal and sagittal reformatted images are provided.    COMPARISON: CT head from 3/19/2020    FINDINGS:    There is no acute intracranial hemorrhage, vasogenic edema or evidence for acute large vascular territory infarct. Stable area of encephalomalacia and volume loss in the medial left parietal lobe compatible with chronic infarct. Small chronic lacunar infarcts in the right sella trauma. Change. Additional patchy areas of low-attenuation inthe bihemispheric white matter nonspecific but likely the sequela of mild chronic microvascular change.    The ventricles, sulci and cisternal spaces are stable in size and configuration demonstrating cerebral of volume loss.  There is no midline shift or abnormal extra-axial fluid collection.    The calvarium is intact.  There are no osteoblastic or lytic calvarial or skull base lesions.  The paranasal sinuses and mastoid air cells are clear.    IMPRESSION:  Stable exam. No acute intracranial hemorrhage, vasogenic edema, hydrocephalus or extra-axial collection. Chronic findings as above.      < from: Xray Chest 1 View- PORTABLE-Urgent (21 @ 23:05) >    EXAM:  XR CHEST PORTABLE URGENT 1V      PROCEDURE DATE:  2021        INTERPRETATION:  AP chest on 2021 at 11:00 PM. Patient has weakness.    Heart suggest enlargement.    On 2012 there is widening of the superior mediastinum possibly vascular in nature. This is again suggested.    Present film shows a large right effusion obscuring most of the right lung detail. This is new since prior. Underlying pathology cannot be excluded.    There is also a small left base effusionwhich is new with an adjacent small infiltrate.    This was followed with CAT scan reported separately.    IMPRESSION: Large right effusion with loculation and smaller left effusion. CAT scan followed.      < from: CT Abdomen and Pelvis No Cont (21 @ 23:35) >    EXAM:  CT ABDOMEN AND PELVIS    EXAM:  CT CHEST      PROCEDURE DATE:  2021        INTERPRETATION:  CLINICAL INFORMATION: Fever, shortness of breath, sepsis.    COMPARISON: 2012 chest CT    CONTRAST/COMPLICATIONS:  IV Contrast: None  OralContrast: None  Complications: None    PROCEDURE:  CT of the Chest, Abdomen and Pelvis was performed.  Sagittal and coronal reformats were performed.    FINDINGS:  Evaluation of solid organs is limited without intravenous contrast.    CHEST:  LUNGS AND LARGE AIRWAYS: Patent central airways. Partial right middle lobe and bilateral lower lobe atelectasis related to effusions and elevated right hemidiaphragm.    PLEURA: Moderate left pleural effusion. Loculated small to moderate right pleural effusion.  VESSELS: Atherosclerotic changes of the aorta and coronary arteries.  HEART: Cardiomegaly. No pericardial effusion.  MEDIASTINUM AND NAVARRO: No lymphadenopathy. Nonspecific small fluid in the right cardiophrenic region (2, 52).  CHEST WALL AND LOWER NECK: Heterogeneous enlarged multinodular thyroid. 6.6 x 4.4 cm heterogeneous mass with internal calcifications in the mediastinum not significantly changed since prior exam and noted to connect to the right thyroid lobe, most compatible with large substernal goiter.    ABDOMEN AND PELVIS:  LIVER: Within normal limits.  BILE DUCTS: Normal caliber.  GALLBLADDER: Cholelithiasis.  SPLEEN: Within normal limits.  PANCREAS: Atrophic.  ADRENALS: Within normal limits.  KIDNEYS/URETERS: No hydronephrosis. Indeterminate hypodense 2.1 cm left renal lesion (2, 83).    BLADDER: Within normal limits.  REPRODUCTIVE ORGANS: Uterus and adnexa within normal limits.    BOWEL: No bowel obstruction. Appendix is normal. Colonic diverticulosis. Mildly distended stomach.  PERITONEUM: Trace perihepatic ascites  VESSELS: Atherosclerotic changes.  RETROPERITONEUM/LYMPH NODES: No lymphadenopathy.  ABDOMINAL WALL: Within normal limits.  BONES: Degenerative changes. Mild L4 compression deformity. Chronic right-sided rib deformities. Absent right humeral head.    IMPRESSION:  Limited exam without intravenous contrast.  Small to moderate loculated right pleural effusion and moderate left pleural effusion with associated bilateral areas of atelectasis.    No acute intra-abdominal findings within the limitations of noncontrast technique.    Indeterminate hypodense 2.1 cm left renal lesion can be further evaluated on follow-up ultrasound.    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          ADVANCE DIRECTIVES: Molst DNR/DNI    Advanced Care Planning discussion total time spent:

## 2021-07-08 NOTE — ED ADULT NURSE NOTE - NSIMPLEMENTINTERV_GEN_ALL_ED
Implemented All Fall with Harm Risk Interventions:  [place] to call system. Call bell, personal items and telephone within reach. Instruct patient to call for assistance. Room bathroom lighting operational. Non-slip footwear when patient is off stretcher. Physically safe environment: no spills, clutter or unnecessary equipment. Stretcher in lowest position, wheels locked, appropriate side rails in place. Provide visual cue, wrist band, yellow gown, etc. Monitor gait and stability. Monitor for mental status changes and reorient to person, place, and time. Review medications for side effects contributing to fall risk. Reinforce activity limits and safety measures with patient and family. Provide visual clues: red socks.

## 2021-07-08 NOTE — H&P ADULT - ASSESSMENT
89 yo f pmhx dementia, nonverbal but physically capable (climbs stairs), DM and HLD admitted with     1. Hypoxic/Hypercapnic Respiratory Failure   2. Multifocal Pneumonia  3. Severe Sepsis  4. UTI    NEURO: Lethargic, opens eyes to noxious stimuli. baseline nonverbal.  Supportive care.  Keep HOB >30 degrees, aspiration precuations.    CV: Severe sepsis progressing to septic shock, receiving crystalloid resuscitation.  Discussed vasopressor therapy/central line access if needed.  Family refusing central line placement or vasopressor therapy.   RESP: Hypoxic/hypercapnic respiratory failure, being placed on Bipap 16/6 fio2 100%, to maintain tv >250, and spo2 >92%, repeat abg for 01:30.  Titrate settings as needed.    RENAL: Monitor lytes, replace as needed.  Bladder scan q6hrs  GI: NPO   ENDO: POCT q6hrs  ID: vanco/cefepime for coverage. Cultures pending   HEME: Heparin for vte ppx   DISPO: DNR/I. Called and updated patient's health care surrogate Dora Benavidez ([facility]) [phone].     Case discussed with eICU attending Dr. Gomez.     Critical Care time: 45 mins assessing presenting problems of acute illness that poses high probability of life threatening deterioration or end organ damage/dysfunction.  Medical decision making including Initiating plan of care, reviewing data, reviewing radiology, discussing with multidisciplinary team, non inclusive of procedures, discussing goals of care with patient/family  89 yo f pmhx dementia, nonverbal but physically capable (climbs stairs), DM and HLD admitted with     1. Hypoxic/Hypercapnic Respiratory Failure   2. Multifocal Pneumonia  3. Severe Sepsis  4. UTI  5. New afib    NEURO: Lethargic, opens eyes to noxious stimuli. baseline nonverbal.  Supportive care.  Keep HOB >30 degrees, aspiration precuations.    CV: Severe sepsis progressing to septic shock, receiving crystalloid resuscitation.  Discussed vasopressor therapy/central line access if needed.  Family refusing central line placement or vasopressor therapy.   RESP: Hypoxic/hypercapnic respiratory failure, being placed on Bipap 16/6 fio2 100%, to maintain tv >250, and spo2 >92%, repeat abg for 01:30.  Titrate settings as needed.    RENAL: Monitor lytes, replace as needed.  Bladder scan q6hrs  GI: NPO   ENDO: POCT q6hrs  ID: vanco/cefepime for coverage. Cultures pending   HEME: FD Lovenox for ac  DISPO: DNR/I. Called and updated patient's health care surrogate Dora Benavidez (niece) [phone].     Case discussed with eICU attending Dr. Gomez.     Critical Care time: 45 mins assessing presenting problems of acute illness that poses high probability of life threatening deterioration or end organ damage/dysfunction.  Medical decision making including Initiating plan of care, reviewing data, reviewing radiology, discussing with multidisciplinary team, non inclusive of procedures, discussing goals of care with patient/family

## 2021-07-09 NOTE — CHART NOTE - NSCHARTNOTEFT_GEN_A_CORE
Nutrition Brief Follow up     Patient remains NPO , BIPAP no longer required , patient placed on nasal canula, SLP consult pending  regarding advancement of diet , ? recent weight (7/9) 122.3/55.5kg , admit weight noted 95lbs , which appears appropriate. Stage ! sacrum noted , no edema present , Will follow clinical course regarding SLP recommendation.

## 2021-07-09 NOTE — HOSPICE CARE NOTE - CONVESATION DETAILS
Called to patient's niece Juanis to discuss home hospice plan of care and services.  Niece stated she could no longer provide care to her aunt in the home, as she is 82 and wheelchair bound and her spouse is elderly as well.  Niece requesting inpatient hospice, discussed inpatient criteria, and niece made aware the patient does not currently meet inpatient criteria.   Case Discussed with Palliative NP Jolynn, and voice message left for .    Patient is approved for home hospice services, but will need 24h private hire, as niece stated they are unable to care for patient.  Disposition pending family ability to hire care.

## 2021-07-09 NOTE — PROGRESS NOTE ADULT - SUBJECTIVE AND OBJECTIVE BOX
Progress: now off bi-pap, remains drowsy and non verbal     Present Symptoms:   Dyspnea:   Nausea/Vomiting:   Anxiety:    Depressed Mood:   Fatigue:   Loss of appetite:   Pain:                   location:   Review of Systems:  Unable to obtain due to poor mentation]    MEDICATIONS  (STANDING):  ALBUTerol    90 MICROgram(s) HFA Inhaler 1 Puff(s) Inhalation every 4 hours  cefepime   IVPB 1000 milliGRAM(s) IV Intermittent every 12 hours  dextrose 40% Gel 15 Gram(s) Oral once  dextrose 5%. 1000 milliLiter(s) (50 mL/Hr) IV Continuous <Continuous>  dextrose 5%. 1000 milliLiter(s) (100 mL/Hr) IV Continuous <Continuous>  dextrose 50% Injectable 25 Gram(s) IV Push once  dextrose 50% Injectable 12.5 Gram(s) IV Push once  dextrose 50% Injectable 25 Gram(s) IV Push once  enoxaparin Injectable 40 milliGRAM(s) SubCutaneous daily  glucagon  Injectable 1 milliGRAM(s) IntraMuscular once  insulin lispro (ADMELOG) corrective regimen sliding scale   SubCutaneous at bedtime  methylPREDNISolone sodium succinate Injectable 40 milliGRAM(s) IV Push every 12 hours  vancomycin  IVPB 750 milliGRAM(s) IV Intermittent daily    MEDICATIONS  (PRN):      PHYSICAL EXAM:  Vital Signs Last 24 Hrs  T(C): 36.9 (2021 12:37), Max: 36.9 (2021 04:00)  T(F): 98.4 (2021 12:37), Max: 98.4 (2021 04:00)  HR: 80 (2021 12:37) (73 - 87)  BP: 104/51 (2021 12:37) (92/47 - 117/50)  BP(mean): 69 (2021 21:00) (60 - 71)  RR: 20 (2021 12:37) (17 - 26)  SpO2: 91% (2021 12:37) (82% - 100%)  General: mostly drowsy , non verbal       HEENT: normal, dry mouth     Lungs: few coarse bs   CV: normal  rate  GI: abd soft, n/t     Musculoskeletal: normal, weakened    Skin: nml, w/d     Neuro: + deficits   Oral intake ability:  oral feeding-TBD  Diet: NPO, until speech eval     LABS:                          11.4   7.27  )-----------( 170      ( 2021 10:15 )             35.5     07-09    140  |  104  |  67<H>  ----------------------------<  137<H>  4.7   |  27  |  1.14    Ca    7.8<L>      2021 10:15  Phos  5.8     -  Mg     2.5     -    TPro  6.9  /  Alb  2.8<L>  /  TBili  0.6  /  DBili  x   /  AST  119<H>  /  ALT  278<H>  /  AlkPhos  69  -09    Urinalysis Basic - ( 2021 15:00 )    Color: Yellow / Appearance: Clear / S.015 / pH: x  Gluc: x / Ketone: Negative  / Bili: Negative / Urobili: Negative   Blood: x / Protein: 15 / Nitrite: Negative   Leuk Esterase: Moderate / RBC: 5-10 /HPF / WBC 11-25 /HPF   Sq Epi: x / Non Sq Epi: Neg.-Few / Bacteria: Moderate /HPF        RADIOLOGY & ADDITIONAL STUDIES:     ADVANCE DIRECTIVES:  Molst dnr/dni, no feeding tubes   Advanced Care Planning discussion total time spent:

## 2021-07-09 NOTE — SWALLOW BEDSIDE ASSESSMENT ADULT - SWALLOW EVAL: DIAGNOSIS
Patient seen bedside with 4LNC in place with SPO2 91%.  Patient with mildly increased WOB with RR 25 at baseline.  Patient extremely lethargic and briefly arouses for brief periods of wakefulness.  Patient would not actively accept PO trials of puree via teaspoon.  Patient without stripping of spoon.  Limited amounts of puree able to be places upon labial surface and scrapped from undersurface up upper dentition.  Patient without bolus manipulation/formulation resulting of oral expectoration of all bolus trials.  Therefore,  assessment of PO is unascertainable at this time.  Defer to MD for PO recommendations.  Patient at high risk for malnutrition /dehydration due to refusal of PO.

## 2021-07-09 NOTE — SWALLOW BEDSIDE ASSESSMENT ADULT - COMMENTS
7/7 Head CT  No intracranial hemorrhage ,vasogenic edema, hydrocephalus or extra-axial collection.  Chronic findings of stable area of encephalomalacia and volume loss in medial left parietal lobe and small chronic lacunar infarcts right sella trauma    7/7 CXR large right effusion with loculation and similar left effusion    7/9 WBC 7/27

## 2021-07-09 NOTE — PROGRESS NOTE ADULT - PROBLEM SELECTOR PLAN 5
Lovenox, start PPI if cannot tolerate PO  Awaiting S&S evaluation to determine PO status  OOB as tolerated resources permitting  DNR/DNI  Accepted to outpatient hospice but family no longer able to provide needs at home.  Appreciate hospice and palliative follow up, continue follow up

## 2021-07-09 NOTE — SWALLOW BEDSIDE ASSESSMENT ADULT - SLP GENERAL OBSERVATIONS
Patient seen in bed, non-verbal on 4LNC with mildly increased WOB.  Patient unable to follow commands and noted to be lethargic with brief periods of arousal and unable to follow basic directives.

## 2021-07-09 NOTE — PROGRESS NOTE ADULT - SUBJECTIVE AND OBJECTIVE BOX
Opens eyes to name, tracks but is non-verbal and does not follow commands.  Appears comfortable.  Remains NPO until speech and swallow evaluation, no BM documented since admission.  Voiding with assistance of incontinence device.    Vital Signs Last 24 Hrs  T(C): 36.9 (09 Jul 2021 09:45), Max: 36.9 (09 Jul 2021 04:00)  T(F): 98.4 (09 Jul 2021 09:45), Max: 98.4 (09 Jul 2021 04:00)  HR: 81 (09 Jul 2021 09:45) (66 - 87)  BP: 96/50 (09 Jul 2021 09:45) (92/47 - 117/50)  BP(mean): 69 (08 Jul 2021 21:00) (60 - 74)  RR: 17 (09 Jul 2021 09:45) (17 - 26)  SpO2: 94% 3L NC  (09 Jul 2021 09:45) (82% - 100%)    Labs                        11.4   7.27  )-----------( 170      ( 09 Jul 2021 10:15 )             35.5     140  |  104  |  67<H>  ----------------------------<  137<H>  4.7   |  27  |  1.14  Ca    7.8<L>      09 Jul 2021 10:15  Phos  5.8     07-09  Mg     2.5     07-09  TPro  6.9  /  Alb  2.8<L>  /  TBili  0.6  /  DBili  x   /  AST  119<H>  /  ALT  278<H>  /  AlkPhos  69  07-09    Current Medications  ALBUTerol    90 MICROgram(s) HFA Inhaler 1 Puff(s) Inhalation every 4 hours  cefepime   IVPB 1000 milliGRAM(s) IV Intermittent every 12 hours  dextrose 40% Gel 15 Gram(s) Oral once  dextrose 5%. 1000 milliLiter(s) (50 mL/Hr) IV Continuous <Continuous>  dextrose 5%. 1000 milliLiter(s) (100 mL/Hr) IV Continuous <Continuous>  dextrose 50% Injectable 25 Gram(s) IV Push once  dextrose 50% Injectable 12.5 Gram(s) IV Push once  dextrose 50% Injectable 25 Gram(s) IV Push once  enoxaparin Injectable 40 milliGRAM(s) SubCutaneous daily  glucagon  Injectable 1 milliGRAM(s) IntraMuscular once  insulin lispro (ADMELOG) corrective regimen sliding scale   SubCutaneous at bedtime  methylPREDNISolone sodium succinate Injectable 40 milliGRAM(s) IV Push every 8 hours  vancomycin  IVPB 750 milliGRAM(s) IV Intermittent daily    MEDICATIONS  (PRN):      Physical Exam  Gen:  WN/WD Female resting in bed, NAD  ENT:  NC/AT, no JVD noted  Thorax:  Symmetric, no retractions  Lung:  CTA b/l  CV:  S1, S2. RRR  Abd:  Soft, NT/ND.  BS normoactive, no masses to palp  Extrem:  No C/C/E, DP/radial pulses +2  Neuro:  A&O x 3, no gross motor/sensory deficits Opens eyes to name, tracks but is non-verbal and does not follow commands.  Appears comfortable.  Remains NPO until speech and swallow evaluation, no BM documented since admission.  Voiding with assistance of incontinence device.    Vital Signs Last 24 Hrs  T(C): 36.9 (09 Jul 2021 09:45), Max: 36.9 (09 Jul 2021 04:00)  T(F): 98.4 (09 Jul 2021 09:45), Max: 98.4 (09 Jul 2021 04:00)  HR: 81 (09 Jul 2021 09:45) (66 - 87)  BP: 96/50 (09 Jul 2021 09:45) (92/47 - 117/50)  BP(mean): 69 (08 Jul 2021 21:00) (60 - 74)  RR: 17 (09 Jul 2021 09:45) (17 - 26)  SpO2: 94% 3L NC  (09 Jul 2021 09:45) (82% - 100%)    Labs                        11.4   7.27  )-----------( 170      ( 09 Jul 2021 10:15 )             35.5     140  |  104  |  67<H>  ----------------------------<  137<H>  4.7   |  27  |  1.14  Ca    7.8<L>      09 Jul 2021 10:15  Phos  5.8     07-09  Mg     2.5     07-09  TPro  6.9  /  Alb  2.8<L>  /  TBili  0.6  /  DBili  x   /  AST  119<H>  /  ALT  278<H>  /  AlkPhos  69  07-09    Current Medications  ALBUTerol    90 MICROgram(s) HFA Inhaler 1 Puff(s) Inhalation every 4 hours  cefepime   IVPB 1000 milliGRAM(s) IV Intermittent every 12 hours  dextrose 40% Gel 15 Gram(s) Oral once  dextrose 5%. 1000 milliLiter(s) (50 mL/Hr) IV Continuous <Continuous>  dextrose 5%. 1000 milliLiter(s) (100 mL/Hr) IV Continuous <Continuous>  dextrose 50% Injectable 25 Gram(s) IV Push once  dextrose 50% Injectable 12.5 Gram(s) IV Push once  dextrose 50% Injectable 25 Gram(s) IV Push once  enoxaparin Injectable 40 milliGRAM(s) SubCutaneous daily  glucagon  Injectable 1 milliGRAM(s) IntraMuscular once  insulin lispro (ADMELOG) corrective regimen sliding scale   SubCutaneous at bedtime  methylPREDNISolone sodium succinate Injectable 40 milliGRAM(s) IV Push every 8 hours  vancomycin  IVPB 750 milliGRAM(s) IV Intermittent daily    MEDICATIONS  (PRN):      Physical Exam  Gen:  WN/WD Female resting in bed, NAD  ENT:  NC/AT, no JVD noted  Thorax:  Symmetric, no retractions  Lung:  CTA b/l  CV:  S1, S2. RRR  Abd:  Soft, NT/ND.  BS normoactive, no masses to palp  Extrem:  No C/C/E, DP/radial    Neuro:  Alert non verbal,  Opens eyes to name, tracks but is non-verbal and does not follow commands.  Appears comfortable.  Remains NPO until speech and swallow evaluation, no BM documented since admission.  Voiding with assistance of incontinence device.    Vital Signs Last 24 Hrs  T(C): 36.9 (09 Jul 2021 09:45), Max: 36.9 (09 Jul 2021 04:00)  T(F): 98.4 (09 Jul 2021 09:45), Max: 98.4 (09 Jul 2021 04:00)  HR: 81 (09 Jul 2021 09:45) (66 - 87)  BP: 96/50 (09 Jul 2021 09:45) (92/47 - 117/50)  BP(mean): 69 (08 Jul 2021 21:00) (60 - 74)  RR: 17 (09 Jul 2021 09:45) (17 - 26)  SpO2: 94% 3L NC  (09 Jul 2021 09:45) (82% - 100%)    Labs                        11.4   7.27  )-----------( 170      ( 09 Jul 2021 10:15 )             35.5     140  |  104  |  67<H>  ----------------------------<  137<H>  4.7   |  27  |  1.14  Ca    7.8<L>      09 Jul 2021 10:15  Phos  5.8     07-09  Mg     2.5     07-09  TPro  6.9  /  Alb  2.8<L>  /  TBili  0.6  /  DBili  x   /  AST  119<H>  /  ALT  278<H>  /  AlkPhos  69  07-09    Current Medications  ALBUTerol    90 MICROgram(s) HFA Inhaler 1 Puff(s) Inhalation every 4 hours  cefepime   IVPB 1000 milliGRAM(s) IV Intermittent every 12 hours  enoxaparin Injectable 40 milliGRAM(s) SubCutaneous daily  insulin lispro (ADMELOG) corrective regimen sliding scale   SubCutaneous at bedtime  methylPREDNISolone sodium succinate Injectable 40 milliGRAM(s) IV Push every 8 hours  vancomycin  IVPB 750 milliGRAM(s) IV Intermittent daily    Physical Exam  Gen:  Elderly female resting in bed, NAD  ENT:  NC/AT, no JVD noted  Thorax:  Symmetric, no retractions  Lung:  CTA b/l  CV:  S1, S2. RRR  Abd:  Soft, NT/ND.  BS normoactive, no masses to palp  Extrem:  No C/C/E, DP/radial    Neuro:  Alert non verbal, moves all extremites

## 2021-07-09 NOTE — SWALLOW BEDSIDE ASSESSMENT ADULT - SWALLOW EVAL: RECOMMENDED DIET
Unascertainable at this time as does not accept bolus trials and orally expectorates all PO attempted.  Defer to MD at this time.

## 2021-07-09 NOTE — SWALLOW BEDSIDE ASSESSMENT ADULT - NS ASR SWALLOW FINDINGS DISCUS
[facility]    [address]    [address]    [place] [address]    Phone:  [phone]    Fax:  [phone]       Thank You for choosing us for your health care visit. We are glad to serve you and happy to provide you with this summary of your visit. Please help us to ensure we have accurate records. If you find anything that needs to be changed, please let our staff know as soon as possible.          Your Demographic Information     Patient Name Sex Iris Royal Female 1960       Ethnic Group Patient Race    Not of  or  Origin Black/      Your Visit Details     Date & Time Provider Department    2017 9:30 AM Yolanda Solitario PA-C [facility] [address]      Your Upcoming Appointment*(Max 10)     2017 10:30 AM CDT   Follow-Up Therapy with Michael Avery PTA   [facility])    [address]  [address]  [place] [address]   [phone]             11:15 AM CDT   Follow-Up Therapy with Michael Avery PTA   [facility])    [address]  [address]  [place] [address]   [phone]            Tuesday May 02, 2017  9:45 AM CDT   Follow-Up Therapy with Michael Avery PTA   [facility])    [address]  [address]  [place] [address]   [phone]            Thursday May 04, 2017 10:30 AM CDT   Follow-Up Therapy with Michael Avery PTA   [facility])    [address]  [address]  [place] [address]   [phone]            Tuesday May 09, 2017 11:15 AM CDT   Follow-Up Therapy with Radha Tierney, PT   [facility])    [address]  [address]  [place] [address]      [phone]            Thursday May 11, 2017 10:00 AM CDT   Follow-Up Therapy with Radha Tierney, PT   [facility] ([facility])    [address]  [address]  [place] [address]   [phone]            Tuesday May 23, 2017  9:45 AM CDT   Office Visit with Presley Yuan MD   [facility] ([facility])    [address]  [facility] [address]   [phone]              Your Vitals Were     Pulse Resp Height Weight LMP BMI    72 16 5' 4\" (1.626 m) 216 lb (98 kg) 02/06/2014 37.08 kg/m2    Smoking Status                   Never Smoker           Medications Prescribed or Re-Ordered Today     None      Your Current Medications Are        Disp Refills Start End    aspirin 325 MG tablet 45 tablet 0 4/7/2017 5/22/2017    Sig - Route: Take 1 tablet by mouth daily. - Oral    Class: Eprescribe    naproxen sodium (ALEVE) 220 MG tablet        Sig - Route: Take 220 mg by mouth 2 times daily (with meals). - Oral    Class: Historical Med    oxyCODONE/APAP (PERCOCET) 5-325 MG per tablet 180 tablet 0 3/15/2017     Sig - Route: Take 1 tablet by mouth every 4 hours as needed for Pain. - Oral    tiZANidine (ZANAFLEX) 4 MG tablet 60 tablet 1 3/15/2017     Sig - Route: Take 1 tablet by mouth every 8 hours as needed (muscle spasm). - Oral    Class: Eprescribe    furosemide (LASIX) 20 MG tablet 90 tablet 1 1/9/2017     Sig - Route: Take 1 tablet by mouth daily. As needed for leg swelling - Oral    Elastic Bandages & Supports (MEDICAL COMPRESSION STOCKINGS) Misc 2 Package 1 7/14/2016     Sig - Route: 2 Packages daily. - Does not apply    Class: Script Not Printed    diphenhydrAMINE (BENADRYL) 25 MG capsule        Sig - Route: Take 25 mg by mouth every 6 hours as needed for Itching. - Oral    Class: Historical Med    omeprazole 20 MG tablet 180 tablet 1 9/22/2015     Sig - Route: Take 1 tablet by mouth 2 times daily before breakfast and at  night. For stomach acid - Oral    Class: Eprescribe    albuterol (PROAIR HFA) 108 (90 BASE) MCG/ACT inhaler 1 Inhaler 12 2/11/2015     Sig - Route: Inhale 2 puffs into the lungs every 4 hours as needed for Shortness of Breath or Wheezing. - Inhalation    Class: Eprescribe    CALCIUM PO        Sig - Route: Take 1 tablet by mouth daily.  - Oral    Class: Historical Med    amitriptyline (ELAVIL) 25 MG tablet 30 tablet 5 11/8/2012     Sig - Route: Take 1 tablet by mouth nightly. - Oral    polyethylene glycol (MIRALAX) powder  0 9/21/2011     Sig - Route: Take 17 g by mouth as needed. - Oral    Class: Historical Med    ferrous gluconate (FERGON) 324 (38 FE) MG tablet  0 9/21/2011     Sig - Route: Take 1 tablet by mouth 2 times daily. - Oral    Class: Historical Med      Allergies     Bactrim Nausea & Vomiting    fainting      Immunizations History as of 4/21/2017     Name Date    INFLUENZA QUADRIVALENT 10/10/2016, 9/22/2015, 10/13/2014    Influenza 10/21/2013, 1/10/2013, 10/20/2010, 9/18/2009, 10/30/2008, 10/12/2005, 10/23/2004, 11/10/2003, 1/30/2003    Influenza A novel H1N1 10/20/2009    Tdap 12/8/2013, 4/18/2006    Tetanus 4/18/2006      Problem List as of 4/21/2017     Constipation due to opioid therapy    DJD bilateral knees    Chronic low back pain    Sciatica    GERD (gastroesophageal reflux disease)    Intercostal muscle pain    Abnormality of gait    Dizziness and giddiness    Peripheral vertigo, unspecified    Chronic neck pain    Costochondritis    Anemia    Osteoarthritis, generalized    Urge incontinence of urine    Encounter for long-term (current) use of high-risk medication    Status post left foot surgery    Chronic pain of left knee    Pain--Interventional Pain Management              Patient Instructions    Your current Orthopaedic problem we are working together to treat is:  S/P total knee arthroplasty.    - Continue Aspirin for 1 more month  - Keep working on knee motion and strengthening   -  Contact office if you need refill of pain medication     It is recommended you schedule a follow-up appointment with Yolanda Solitario PA-C  4 weeks.      Office hours are 8:00 am to 5:00 pm Monday through Friday. If it is urgent that you speak with someone outside of these hours, our [facility] Call Center will be able to assist you. You can reach the office by calling the:    [address]  [phone]    We do highly recommend [employer], if you do not already have this. You can request access via the internet or by simply talking with a  at any of the clinics.   [email or web address].    You may receive a survey in the mail from [facility]. They mail and process patient satisfaction surveys for our clinic. Should you receive a survey, please take a few minutes to rate your experience with your visit.  We value your opinions and insights. Thank you in advance for your time and interest in responding.    Thank you for choosing [facility] as your Orthopaedic provider!          WILMA De Paz/Nursing

## 2021-07-10 NOTE — PROGRESS NOTE ADULT - CONVERSATION DETAILS
Spoke to patient's niece Juanis via phone and updated her on patient's status, specifically patient's refusal to participate in any type of feeding or swallow assessment.    Juanis reiterated that she does not want any type of artificial feeding including temporary feeding tube.  I mentioned we will continue to assess patient daily and start gentle hydration via IV but patient now may becoming more appropriate for inpatient services if she is not taking food or medications PO.

## 2021-07-10 NOTE — PROGRESS NOTE ADULT - SUBJECTIVE AND OBJECTIVE BOX
Smiles and makes eye contact, does not respond to name or follow commands.  Willfully resisted attempt to assess swallow ability this AM.  Oxygen saturations to 86 on room air.    Voiding with assistance of incontinence device, no BM documented since admission, has remained on bedrest.    Vital Signs Last 24 Hrs  T(C): 36.4 (10 Jul 2021 05:00), Max: 36.9 (09 Jul 2021 09:45)  T(F): 97.6 (10 Jul 2021 05:00), Max: 98.4 (09 Jul 2021 09:45)  HR: 76 (10 Jul 2021 08:36) (75 - 81)  BP: 107/41 (10 Jul 2021 05:00) (96/50 - 110/50)  RR: 19 (10 Jul 2021 05:00) (17 - 22)  SpO2: 93% 4L NC (10 Jul 2021 08:36) (86% - 96%)    I&O's Detail    09 Jul 2021 07:01  -  10 Jul 2021 07:00  --------------------------------------------------------  IN:  Total IN: 0 mL    OUT:    Oral Fluid: 0 mL    Voided (mL): 700 mL  Total OUT: 700 mL    Total NET: -700 mL    No new labs or imaging    Current Medications  ALBUTerol    90 MICROgram(s) HFA Inhaler 1 Puff(s) Inhalation every 4 hours  cefepime   IVPB 1000 milliGRAM(s) IV Intermittent every 12 hours  enoxaparin Injectable 40 milliGRAM(s) SubCutaneous daily  glucagon  Injectable 1 milliGRAM(s) IntraMuscular once  insulin lispro (ADMELOG) corrective regimen sliding scale   SubCutaneous at bedtime  methylPREDNISolone sodium succinate Injectable 40 milliGRAM(s) IV Push every 12 hours  vancomycin  IVPB 750 milliGRAM(s) IV Intermittent daily    Physical Exam  Gen:  Elderly female resting in bed, NAD  ENT:  NC/AT, no JVD noted  Thorax:  Symmetric, no retractions  Lung:  CTA b/l  CV:  S1, S2. RRR  Abd:  Soft, NT/ND.  BS normoactive, no masses to palp  Extrem:  No C/C/E, DP/radial    Neuro:  Alert non verbal, moves all extremites

## 2021-07-10 NOTE — PROGRESS NOTE ADULT - PROBLEM SELECTOR PLAN 1
Acute hypercapnic from probable aspiration pneumonia, now weaned from NIV.  Continue Cefepime and vancomycin with bronchodilators, taper steroids. Monitor fever curve and WBC count, wean O2 as tolerated
Acute hypercapnic from probable aspiration pneumonia, now weaned from NIV.  Continue Cefepime and vancomycin with bronchodilators, taper steroids. Monitor fever curve and WBC count, wean O2 as tolerated

## 2021-07-10 NOTE — PROGRESS NOTE ADULT - PROBLEM SELECTOR PLAN 5
Lovenox, start PPI if cannot tolerate PO  Not participating in PO trials, start IVF for hydration, discuss nutrition with family  OOB as tolerated resources permitting  DNR/DNI  Accepted to outpatient hospice but family no longer able to provide needs at home.  Appreciate hospice and palliative follow up, continue follow up Lovenox, start PPI if cannot tolerate PO  Not participating in PO trials, start IVF for hydration, family does not want artifical feeds  OOB as tolerated resources permitting  DNR/DNI  Accepted to outpatient hospice but family no longer able to provide needs at home.  Appreciate hospice and palliative follow up, continue follow up

## 2021-07-10 NOTE — PROGRESS NOTE ADULT - PROBLEM SELECTOR PLAN 2
Continue supportive care, patient non-verbal and apparently growing harder to care for at home, family feels they can no longer appropriately support her
Continue supportive care, patient non-verbal and apparently growing harder to care for at home, family feels they can no longer appropriately support her

## 2021-07-11 NOTE — PROGRESS NOTE ADULT - SUBJECTIVE AND OBJECTIVE BOX
Called to evaluate pt unresponsive. VS not obtainable.   P/E  General unresponsive to noxious stimuli   HEENT pupils fixed and dilated  Lung no BS auscultated  CV no heart sounds no palpable pulses   Tele asystole     Pt is DVNR    Death pronounced at 11:01pm     Aditya Calderon notified.     MD aware.

## 2021-07-11 NOTE — PROGRESS NOTE ADULT - NUTRITIONAL ASSESSMENT
This patient has been assessed with a concern for Malnutrition and has been determined to have a diagnosis/diagnoses of Severe protein-calorie malnutrition and Underweight (BMI < 19).    This patient is being managed with:   Diet Dysphagia 1 Pureed-Honey Consistency Fluid-  Entered: Jul 9 2021 12:42PM    
This patient has been assessed with a concern for Malnutrition and has been determined to have a diagnosis/diagnoses of Severe protein-calorie malnutrition and Underweight (BMI < 19).    This patient is being managed with:   Diet NPO-  Entered: Jul 7 2021 10:03PM    
This patient has been assessed with a concern for Malnutrition and has been determined to have a diagnosis/diagnoses of Severe protein-calorie malnutrition and Underweight (BMI < 19).    This patient is being managed with:   Diet NPO-  Entered: Jul 7 2021 10:03PM    
This patient has been assessed with a concern for Malnutrition and has been determined to have a diagnosis/diagnoses of Severe protein-calorie malnutrition and Underweight (BMI < 19).    This patient is being managed with:   Diet Dysphagia 1 Pureed-Honey Consistency Fluid-  Entered: Jul 9 2021 12:42PM

## 2021-07-11 NOTE — PROGRESS NOTE ADULT - ATTENDING COMMENTS
Assessment  89 year old female with h/o dementia- non verbal, DM2, High chol who was brought in from home for increasing weakness.       Per patient's great nephew, patient with decline over the last week; weakness, poor po intake, did not get out of bed much.  In ED patient found to be hypotensive and hypoxic.  Patient given 1L NS, vanco/cefepime, placed on NRB.  Lab significant for leukocytosis, hyperkalemia (*moderately hemolyzed), lactate 2.1, UA +. CT chest with multifocal pneumonia (official read pending). ABG obtained 7.24/72/82/32. Patient placed on bipap.  Admit to CCU.    Problem List   Acute hypercarbic respiratory failure now off NIV   Suspected aspiration PNA   Abnormal CT chest with b/l pl effusion, Moderate left pleural effusion. Loculated small to moderate right pleural effusion.   Abnormal CT Chest - Heterogeneous enlarged multinodular thyroid. 6.6 x 4.4 cm heterogeneous mass with internal calcifications in the mediastinum not significantly changed since prior exam and noted to connect to the right thyroid lobe, most compatible with large substernal goiter.     possible underlying infection    Underlying Dementia    DM2, high chol, h/o breast cancer       Plan  Taper NIV towards off, tolerating being on RA  taper steroids, nebs , antibiotics, bronchodilators  advance diet  S&S eval   Insulin SS  Palliative care f/u  GOC DNR/I no escalation of care  S&S eval prior to advance diet     continue care on AI
Poor oral intake  Palliative care f/u  GOC DNR/I no escalation of care  S&S eval prior to advance diet but patient is refusing to participate     continue care on AI  Appears to be failure to thrive
Poor oral intake  Palliative care f/u  GOC DNR/I no escalation of care  S&S eval prior to advance diet but patient is refusing to participate     continue care on AI

## 2021-07-11 NOTE — PROGRESS NOTE ADULT - ASSESSMENT
89 year old female with PMH non-verbal dementia, DM2, and dyslipidemia who was brought in from home for increasing weakness found to have acute hypercapnic respiratory failure from suspected aspiration pneumonia    Respiratory failure. Acute hypercapnic from probable aspiration pneumonia, on NC but not in use due to MS, on RA in no distress,   Continue Cefepime and vancomycin with bronchodilators, taper steroids. Monitor fever curve and WBC count, wean O2 as tolerated.     Dementia: continue supportive care, patient non-verbal and apparently growing harder to care for at home, family feels they can no longer appropriately support her. home hospice being setup    Dyslipidemia.  Continue statin therapy when tolerating PO.       Type 2 diabetes mellitus.  FS with RISS.     Prophylactic measure: Lovenox, start PPI if cannot tolerate PO  Not participating in PO trials, VF for hydration, family does not want artifical feeds  OOB as tolerated resources permitting  DNR/DNI  Accepted to outpatient hospice but family no longer able to provide needs at home.  Appreciate hospice and palliative follow up, continue follow up. possible in patient. 
A/P  89 yo f pmhx dementia, nonverbal but physically capable (climbs stairs), DM and HLD biba from home with complaints of weakness.  Per chart and patient's great nephew, patient with decline over the last week; weakness, poor po  In ED patient found to be hypotensive and hypoxic.  Patient given 1L NS, vanco/cefepime, placed on NRB.  Lab significant for leukocytosis, hyperkalemia (*moderately hemolyzed), lactate 2.1, UA +. CT chest with multifocal pneumonia (official read pending). ABG obtained 7.24/72/82/32. Patient initially placed on bipap, and was admitted to CCU. Is now off bi-pap and in surg bed.   Remains NPO until speech and swallow evaluation done.   Mostly drowsy and not verbal ( baseline)       per ccu team:   Problem/Plan - 1:  ·  Problem: Respiratory failure.  Plan: Acute hypercapnic from probable aspiration pneumonia, now weaned from NIV.  Continue Cefepime and vancomycin with bronchodilators, taper steroids. Monitor fever curve and WBC count, wean O2 as tolerated.     Problem/Plan - 2:  ·  Problem: Dementia.  Plan: Continue supportive care, patient non-verbal and apparently growing harder to care for at home, family feels they can no longer appropriately support her.     Problem/Plan - 3:  ·  Problem: Dyslipidemia.  Plan: Continue statin therapy when tolerating PO.     Problem/Plan - 4:  ·  Problem: Type 2 diabetes mellitus.  Plan: FS with RISS.     Problem/Plan - 5:  ·  Problem: Prophylactic measure.  Plan: Lovenox, start PPI if cannot tolerate PO  Awaiting S&S evaluation to determine PO status  OOB as tolerated resources permitting  DNR/DNI  Accepted to home hospice but family no longer able to provide needs at home.  Appreciate hospice and   Palliative input.      Palliative :   as f/u case reviewed w/ ccu team this AM, pt seen bedside, is now off bi-pap, and looks comfortable. Mostly drowsy  and baseline non- verbal .  Spoke w/ [facility] rep Dee ROMERO, pt is approved for home hospice services, however family now saying they can no longer properly care for her at home. Pt lives with 83 yo niece who is wheel chair bound.  Pt currently not inpatient hospice appropriate .   Called and spoke to nimame Olivarez, she expressed that she and her  can no longer care for pt at home. She is aware of approval for home hospice services, and aware pt not currently  inpatient  hospice appropriate.  CM aware /med team aware.   Gloria completed this admission for Dnr/Dni , no feeding tube.      Speech/swallow eval pending   Dispo pending clinical course.              
Physical Examination:  GENERAL:               arousable, Not  Oriented, No acute distress while on NIV  HEENT:                    Pupils equal, reactive to light.  Symmetric. No JVD, dry MM  PULM:                    No Rales, No Rhonchi, No Wheezing  CVS:                         S1, S2,  + Murmur  ABD:                        Soft, nondistended, nontender, normoactive bowel sounds,   EXT:                         Mild edema, nontender, No Cyanosis or Clubbing   NEURO:                  Arousable, not oriented, not interactive, does not follow commands  PSYC:                      Calm, no isight.        Assessment    89 year old female with h/o dementia- non verbal, DM2, High chol who was brought in from home for increasing weakness.   Per patient's great nephew, patient with decline over the last week; weakness, poor po intake, did not get out of bed much.  In ED patient found to be hypotensive and hypoxic.  Patient given 1L NS, vanco/cefepime, placed on NRB.  Lab significant for leukocytosis, hyperkalemia (*moderately hemolyzed), lactate 2.1, UA +. CT chest with multifocal pneumonia (official read pending). ABG obtained 7.24/72/82/32. Patient placed on bipap.  Admit to CCU.    Problem List   Acute hypercarbic respiratory failure   Suspected aspiration PNA    unsure of underlying COPD/Smoking status at this time   Abnormal CT chest with b/l pl effusion, Moderate left pleural effusion. Loculated small to moderate right pleural effusion.   Abnormal CT Chest - Heterogeneous enlarged multinodular thyroid. 6.6 x 4.4 cm heterogeneous mass with internal calcifications in the mediastinum not significantly changed since prior exam and noted to connect to the right thyroid lobe, most compatible with large substernal goiter.     possible underlying infection    Underlying Dementia    DM2, high chol, h/o breast cancer       Plan  Taper NIV towards off  steroids, nebs , antibiotics, bronchodilators  NPO for now, advance diet when able  Insulin SS  lasix x 1  Palliative care eval  GOC to be addressed  S&S eval prior to advance diet    check TTE      PMD:		Dr. Parr		                   Notified(Date):  Family Updated: 	Juanis  called 	   [phone] Date: 7/8/21    patient was loosing weight, decreased PO intake x few weeks    after dinner not able to breath.     patient has difficulty chewing with food, does not allow dentist to pull out teeth    has chopped diet    1 month ago took moderna vaccine. - both shots.    h/o LE swelling now resolved.     Confirm DNR/I no pressors, no cvp line    Sedation & Analgesia:	on hold  Diet/Nutrition:		tube feeding  GI PPx:			PPI    DVT Ppx:		Lovenox  	RISK                                                          Points  	[ ] Previous VTE                                           	3  	[ ] Thrombophilia                                        	2  	[ ] Lower limb paralysis                              	2   	[ ] Current Cancer                                       	2   	[ ] Immobilization > 24 hrs                        	1  	[ x] ICU/CCU stay > 24 hours                       	1  	[x ] Age > 60                                                   	1  		Total:[2 ]      Activity:		               advance as tolerated  Head of Bed:               35-45 deg  Glycemic Control:        insulin ss    Lines: PIV     Restraints were deemed necessary to prevent removal of life-sustaining devices [  ] YES   [   x ]  NO    Disposition: can transfer to      Goals of Care: DNR/I
89 year old female with PMH non-verbal dementia, DM2, and dyslipidemia who was brought in from home for increasing weakness found to have acute hypercapnic respiratory failure from suspected aspiration pneumonia
89 year old female with PMH non-verbal dementia, DM2, and dyslipidemia who was brought in from home for increasing weakness found to have acute hypercapnic respiratory failure from suspected aspiration pneumonia

## 2021-07-11 NOTE — PROGRESS NOTE ADULT - SUBJECTIVE AND OBJECTIVE BOX
AI Progress Note     Smiles and makes eye contact, respond to name to day with a smile but not following commands.  Oxygen saturations high 70s to mid 80s on RA, but poor waveform from constant movement.   Voiding with assistance of incontinence device, no BM documented since admission, has remained on bedrest.          Allergies : penicillin (Unknown)  tetracycline (Unknown)      PMH:  No pertinent past medical history [[id number]]        Medications:  ALBUTerol    90 MICROgram(s) HFA Inhaler 1 Puff(s) Inhalation every 4 hours  cefepime   IVPB 1000 milliGRAM(s) IV Intermittent every 12 hours  dextrose 40% Gel 15 Gram(s) Oral once  dextrose 5% + sodium chloride 0.9%. 1000 milliLiter(s) (50 mL/Hr) IV Continuous <Continuous>  dextrose 5%. 1000 milliLiter(s) (100 mL/Hr) IV Continuous <Continuous>  dextrose 5%. 1000 milliLiter(s) (50 mL/Hr) IV Continuous <Continuous>  dextrose 50% Injectable 25 Gram(s) IV Push once  dextrose 50% Injectable 12.5 Gram(s) IV Push once  dextrose 50% Injectable 25 Gram(s) IV Push once  enoxaparin Injectable 40 milliGRAM(s) SubCutaneous daily  glucagon  Injectable 1 milliGRAM(s) IntraMuscular once  insulin lispro (ADMELOG) corrective regimen sliding scale   SubCutaneous every 6 hours  methylPREDNISolone sodium succinate Injectable 40 milliGRAM(s) IV Push every 12 hours  vancomycin  IVPB 750 milliGRAM(s) IV Intermittent daily      LABS:                        11.4   7.27  )-----------( 170      ( 09 Jul 2021 10:15 )             35.5     07-09    140  |  104  |  67<H>  ----------------------------<  137<H>  4.7   |  27  |  1.14    Ca    7.8<L>      09 Jul 2021 10:15  Phos  5.8     07-09  Mg     2.5     07-09    TPro  6.9  /  Alb  2.8<L>  /  TBili  0.6  /  DBili  x   /  AST  119<H>  /  ALT  278<H>  /  AlkPhos  69  07-09                        CULTURES: (if applicable)    Culture - Blood (collected 07-07-21 @ 22:10)  Source: .Blood Blood  Preliminary Report (07-09-21 @ 06:01):    No growth to date.    Culture - Blood (collected 07-07-21 @ 22:10)  Source: .Blood Blood  Preliminary Report (07-09-21 @ 06:01):    No growth to date.    Culture - Urine (collected 07-07-21 @ 22:10)  Source: .Urine Clean Catch (Midstream)  Preliminary Report (07-10-21 @ 14:10):    >100,000 CFU/ml Escherichia coli      Rapid RVP Result: NotDetec (07-07-21 @ 22:10)        CAPILLARY BLOOD GLUCOSE      POCT Blood Glucose.: 216 mg/dL (11 Jul 2021 06:09)      RADIOLOGY  CXR:      CT:    ECHO:      VITALS:  T(C): 36.7 (07-11-21 @ 08:00), Max: 36.7 (07-10-21 @ 17:05)  T(F): 98.1 (07-11-21 @ 08:00), Max: 98.1 (07-11-21 @ 08:00)  HR: 84 (07-11-21 @ 08:00) (71 - 85)  BP: 117/55 (07-11-21 @ 08:00) (106/50 - 117/55)  BP(mean): --  ABP: --  ABP(mean): --  RR: 17 (07-11-21 @ 08:00) (17 - 22)  SpO2: 97% (07-11-21 @ 08:00) (94% - 99%)  CVP(mm Hg): --  CVP(cm H2O): --    Ins and Outs     07-10-21 @ 07:01  -  07-11-21 @ 07:00  --------------------------------------------------------  IN: 600 mL / OUT: 650 mL / NET: -50 mL                I&O's Detail    10 Jul 2021 07:01  -  11 Jul 2021 07:00  --------------------------------------------------------  IN:    dextrose 5% + sodium chloride 0.9%: 600 mL  Total IN: 600 mL    OUT:    Voided (mL): 650 mL  Total OUT: 650 mL    Total NET: -50 mL          Physical Exam  Gen:  Elderly female resting in bed, NAD  ENT:  NC/AT, no JVD noted  Thorax:  Symmetric, no retractions  Lung:  CTA b/l  CV:  S1, S2. RRR  Abd:  Soft, NT/ND.  BS normoactive, no masses to palp  Extrem:  No C/C/E, DP/radial    Neuro:  Alert non verbal, moves all extremities spontaneously

## 2021-07-11 NOTE — PROGRESS NOTE ADULT - REASON FOR ADMISSION
Hypercapnic Respiratory Failure

## 2021-07-12 NOTE — DISCHARGE NOTE FOR THE EXPIRED PATIENT - PRINCIPAL DIAGNOSIS
Problem: Pediatric Inpatient Plan of Care  Goal: Plan of Care Review  Outcome: Outcome(s) achieved Date Met: 07/15/19  VSS; pt afebrile. Tolerating PO intake with adequate output noted. PIV to L FA DC'd per order. Discharge instructions and follow up appointments reviewed; verbalized understanding. Medication admin instructions reviewed; verbalized understanding. Pt mom given schedule for blood glucose checks and insulin administration. No other complaints or evident distress noted. Pt off unit with parents.       Respiratory failure, unspecified chronicity, unspecified whether with hypoxia or hypercapnia

## 2021-07-12 NOTE — DISCHARGE NOTE FOR THE EXPIRED PATIENT - HOSPITAL COURSE
89 year old female with h/o dementia- non verbal, DM2, High chol who was brought in from home for increasing weakness on 7/7/21.  Patient's family reports, patient had declining health and function over the last week prior to ED presentation; Patient also reportedly has weakness, poor po intake, did not get out of bed much.      In ED patient found to be hypotensive and hypoxic.  Patient given 1L NS, vanco/cefepime, placed on NRB.  Lab significant for leukocytosis, hyperkalemia (*moderately hemolyzed), lactate 2.1, UA +. CT chest with multifocal pneumonia (official read pending). ABG obtained 7.24/72/82/32.     Patient failed NRB, she was placed on BiPap and admitted to CCU. She was treated for Acute hypercarbic respiratory failure, suspected aspiration PNA, b/l pleural effusions,. Along with NIV, treatment course also included steroids, nebulized bronchodilators, antibiotics and prn lasix. Patient was weaned off NIV soon thereafter and steroids were tapered as well. Patient was eventually seen by palliative care and goals of care was established. Family requested DNR/I - no escalation of care. Patient ceased to breathe and was pronounced dead on 7/11/21 at 23:01. DNR/DNI remained in place and family was notified of patient's death.

## 2021-07-13 LAB
CULTURE RESULTS: SIGNIFICANT CHANGE UP
CULTURE RESULTS: SIGNIFICANT CHANGE UP
SPECIMEN SOURCE: SIGNIFICANT CHANGE UP
SPECIMEN SOURCE: SIGNIFICANT CHANGE UP

## 2022-10-24 NOTE — ED ADULT TRIAGE NOTE - STATUS:
Applied Melolabial Interpolation Flap Text: A decision was made to reconstruct the defect utilizing an interpolation axial flap and a staged reconstruction.  A telfa template was made of the defect.  This telfa template was then used to outline the melolabial interpolation flap.  The donor area for the pedicle flap was then injected with anesthesia.  The flap was excised through the skin and subcutaneous tissue down to the layer of the underlying musculature.  The pedicle flap was carefully excised within this deep plane to maintain its blood supply.  The edges of the donor site were undermined.   The donor site was closed in a primary fashion.  The pedicle was then rotated into position and sutured.  Once the tube was sutured into place, adequate blood supply was confirmed with blanching and refill.  The pedicle was then wrapped with xeroform gauze and dressed appropriately with a telfa and gauze bandage to ensure continued blood supply and protect the attached pedicle.

## 2022-11-04 NOTE — ED ADULT NURSE NOTE - NS ED NURSE DC PT EDUCATION RESOURCES
Problem: PAIN - ADULT  Goal: Verbalizes/displays adequate comfort level or baseline comfort level  Description: Interventions:  - Encourage patient to monitor pain and request assistance  - Assess pain using appropriate pain scale  - Administer analgesics based on type and severity of pain and evaluate response  - Implement non-pharmacological measures as appropriate and evaluate response  - Consider cultural and social influences on pain and pain management  - Notify physician/advanced practitioner if interventions unsuccessful or patient reports new pain  Outcome: Progressing     Problem: INFECTION - ADULT  Goal: Absence or prevention of progression during hospitalization  Description: INTERVENTIONS:  - Assess and monitor for signs and symptoms of infection  - Monitor lab/diagnostic results  - Monitor all insertion sites, i e  indwelling lines, tubes, and drains  - Monitor endotracheal if appropriate and nasal secretions for changes in amount and color  - [place] appropriate cooling/warming therapies per order  - Administer medications as ordered  - Instruct and encourage patient and family to use good hand hygiene technique  - Identify and instruct in appropriate isolation precautions for identified infection/condition  Outcome: Progressing  Goal: Absence of fever/infection during neutropenic period  Description: INTERVENTIONS:  - Monitor WBC    Outcome: Progressing     Problem: SAFETY ADULT  Goal: Patient will remain free of falls  Description: INTERVENTIONS:  - Educate patient/family on patient safety including physical limitations  - Instruct patient to call for assistance with activity   - Consult OT/PT to assist with strengthening/mobility   - Keep Call bell within reach  - Keep bed low and locked with side rails adjusted as appropriate  - Keep care items and personal belongings within reach  - Initiate and maintain comfort rounds  - Make Fall Risk Sign visible to staff  - Offer Toileting every 2 Hours, in advance of need  - Initiate/Maintain bed/chair alarm  - Obtain necessary fall risk management equipment: chair alarm  - Apply yellow socks and bracelet for high fall risk patients  - Consider moving patient to room near nurses station  Outcome: Progressing  Goal: Maintain or return to baseline ADL function  Description: INTERVENTIONS:  -  Assess patient's ability to carry out ADLs; assess patient's baseline for ADL function and identify physical deficits which impact ability to perform ADLs (bathing, care of mouth/teeth, toileting, grooming, dressing, etc )  - Assess/evaluate cause of self-care deficits   - Assess range of motion  - Assess patient's mobility; develop plan if impaired  - Assess patient's need for assistive devices and provide as appropriate  - Encourage maximum independence but intervene and supervise when necessary  - Involve family in performance of ADLs  - Assess for home care needs following discharge   - Consider OT consult to assist with ADL evaluation and planning for discharge  - Provide patient education as appropriate  Outcome: Progressing  Goal: Maintains/Returns to pre admission functional level  Description: INTERVENTIONS:  - Perform BMAT or MOVE assessment daily    - Set and communicate daily mobility goal to care team and patient/family/caregiver  - Collaborate with rehabilitation services on mobility goals if consulted  - Reposition patient every 2 hours    - Stand patient 3 times a day  - Ambulate patient 2 times a day  - Out of bed to chair 3 times a day   - Out of bed for meals 3 times a day  - Out of bed for toileting  - Record patient progress and toleration of activity level   Outcome: Progressing     Problem: DISCHARGE PLANNING  Goal: Discharge to home or other facility with appropriate resources  Description: INTERVENTIONS:  - Identify barriers to discharge w/patient and caregiver  - Arrange for needed discharge resources and transportation as appropriate  - Identify discharge learning needs (meds, wound care, etc )  - Arrange for interpretive services to assist at discharge as needed  - Refer to Case Management Department for coordinating discharge planning if the patient needs post-hospital services based on physician/advanced practitioner order or complex needs related to functional status, cognitive ability, or social support system  Outcome: Progressing     Problem: Knowledge Deficit  Goal: Patient/family/caregiver demonstrates understanding of disease process, treatment plan, medications, and discharge instructions  Description: Complete learning assessment and assess knowledge base    Interventions:  - Provide teaching at level of understanding  - Provide teaching via preferred learning methods  Outcome: Progressing     Problem: GASTROINTESTINAL - ADULT  Goal: Maintains or returns to baseline bowel function  Description: INTERVENTIONS:  - Assess bowel function  - Encourage oral fluids to ensure adequate hydration  - Administer IV fluids if ordered to ensure adequate hydration  - Administer ordered medications as needed  - Encourage mobilization and activity  - Consider nutritional services referral to assist patient with adequate nutrition and appropriate food choices  Outcome: Progressing     Problem: SKIN/TISSUE INTEGRITY - ADULT  Goal: Skin Integrity remains intact(Skin Breakdown Prevention)  Description: Assess:  -Perform Prateek assessment every shift  -Clean and moisturize skin with every incontinence  -Inspect skin when repositioning, toileting, and assisting with ADLS  -Assess extremities for adequate circulation and sensation     Bed Management:  -Have minimal linens on bed & keep smooth, unwrinkled  -Change linens as needed when moist or perspiring  -Avoid sitting or lying in one position for more than 2hours while in bed  -Keep HOB at 30 degrees     Toileting:  -Offer bedside commode  -Assess for incontinence every 2 hours  -Use incontinent care products after each incontinent episode such as calmazine cream    Activity:  -Mobilize patient 2 times a day  -Encourage activity and walks on unit  -Encourage or provide ROM exercises   -Turn and reposition patient every 2 Hours  -Use appropriate equipment to lift or move patient in bed  -Instruct/ Assist with weight shifting every 30 minutes when out of bed in chair  -Consider limitation of chair time 3 hour intervals    Skin Care:  -Avoid use of baby powder, tape, friction and shearing, hot water or constrictive clothing  -Relieve pressure over bony prominences using Allevyns  -Do not massage red bony areas    Next Steps:  -Teach patient strategies to minimize risks such as weight shifting  -Consider consults to  interdisciplinary teams such as PT/OT  Outcome: Progressing  Goal: Incision(s), wounds(s) or drain site(s) healing without S/S of infection  Description: INTERVENTIONS  - Assess and document dressing, incision, wound bed, drain sites and surrounding tissue  - Provide patient and family education  - Perform skin care/dressing changes every shift or as ordered  Outcome: Progressing     Problem: HEMATOLOGIC - ADULT  Goal: Maintains hematologic stability  Description: INTERVENTIONS  - Assess for signs and symptoms of bleeding or hemorrhage  - Monitor labs  - Administer supportive blood products/factors as ordered and appropriate  Outcome: Progressing     Problem: Nutrition/Hydration-ADULT  Goal: Nutrient/Hydration intake appropriate for improving, restoring or maintaining nutritional needs  Description: Monitor and assess patient's nutrition/hydration status for malnutrition  Collaborate with interdisciplinary team and initiate plan and interventions as ordered  Monitor patient's weight and dietary intake as ordered or per policy  Utilize nutrition screening tool and intervene as necessary  Determine patient's food preferences and provide high-protein, high-caloric foods as appropriate       INTERVENTIONS:  - Monitor oral intake, urinary output, labs, and treatment plans  - Assess nutrition and hydration status and recommend course of action  - Evaluate amount of meals eaten  - Assist patient with eating if necessary   - Allow adequate time for meals  - Recommend/ encourage appropriate diets, oral nutritional supplements, and vitamin/mineral supplements  - Order, calculate, and assess calorie counts as needed  - Recommend, monitor, and adjust tube feedings and TPN/PPN based on assessed needs  - Assess need for intravenous fluids  - Provide specific nutrition/hydration education as appropriate  - Include patient/family/caregiver in decisions related to nutrition  Outcome: Progressing     Problem: Potential for Falls  Goal: Patient will remain free of falls  Description: INTERVENTIONS:  - Educate patient/family on patient safety including physical limitations  - Instruct patient to call for assistance with activity   - Consult OT/PT to assist with strengthening/mobility   - Keep Call bell within reach  - Keep bed low and locked with side rails adjusted as appropriate  - Keep care items and personal belongings within reach  - Initiate and maintain comfort rounds  - Make Fall Risk Sign visible to staff  - Offer Toileting every 2 Hours, in advance of need  - Initiate/Maintain bed/chair alarm  - Obtain necessary fall risk management equipment: chair alarm  - Apply yellow socks and bracelet for high fall risk patients  - Consider moving patient to room near nurses station  Outcome: Progressing     Problem: MOBILITY - ADULT  Goal: Maintain or return to baseline ADL function  Description: INTERVENTIONS:  -  Assess patient's ability to carry out ADLs; assess patient's baseline for ADL function and identify physical deficits which impact ability to perform ADLs (bathing, care of mouth/teeth, toileting, grooming, dressing, etc )  - Assess/evaluate cause of self-care deficits   - Assess range of motion  - Assess patient's mobility; develop plan if impaired  - Assess patient's need for assistive devices and provide as appropriate  - Encourage maximum independence but intervene and supervise when necessary  - Involve family in performance of ADLs  - Assess for home care needs following discharge   - Consider OT consult to assist with ADL evaluation and planning for discharge  - Provide patient education as appropriate  Outcome: Progressing  Goal: Maintains/Returns to pre admission functional level  Description: INTERVENTIONS:  - Perform BMAT or MOVE assessment daily    - Set and communicate daily mobility goal to care team and patient/family/caregiver  - Collaborate with rehabilitation services on mobility goals if consulted  - Reposition patient every 2 hours    - Stand patient 3 times a day  - Ambulate patient 2 times a day  - Out of bed to chair 3 times a day   - Out of bed for meals 3 times a day  - Out of bed for toileting  - Record patient progress and toleration of activity level   Outcome: Progressing None needed

## 2023-11-30 NOTE — PROGRESS NOTE ADULT - PROBLEM SELECTOR PROBLEM 3
----- Message from Pedro Hayes sent at 2023  4:41 PM CST -----  Contact: Zena-mother  Daniel Batres  MRN: [id number]  : 2005  PCP: Virginie Hastings  Home Phone      [phone]  Work Phone      Not on file.  Mobile          [phone]      MESSAGE:   Patient is having sharp pains on her right side, please call patient mother      [phone]    
Spoke to mom, she stated they had already pulled up at the ER and was gonna have her checked out.  
Dyslipidemia
Dyslipidemia

## 2023-12-05 NOTE — ED PROVIDER NOTE - CROS ED CARDIOVAS ALL NEG
Procedure:  COLONOSCOPY    Relevant Problems   GI/HEPATIC   (+) Gastroesophageal reflux disease without esophagitis      HEMATOLOGY   (+) Anemia      NEURO/PSYCH   (+) Anxiety   (+) Depression      PULMONARY   (+) Asthma    Asthma (exercise-induced) is well-controlled  ADHD      Physical Exam    Airway    Mallampati score: II  TM Distance: <3 FB  Neck ROM: full     Dental   Comment: NONE LOOSE, No notable dental hx     Cardiovascular      Pulmonary      Other Findings  post-pubertal.      Anesthesia Plan  ASA Score- 2     Anesthesia Type- IV sedation with anesthesia with ASA Monitors. Additional Monitors:     Airway Plan:     Comment: Last of PO bowel prep: 07:10  Urine hcg =      Patient educated on the possibility for awareness under sedation and of the possibility of airway intervention in the event of an airway or procedural emergency  . Plan Factors-Exercise tolerance (METS): >4 METS. Chart reviewed. Patient summary reviewed. Patient is not a current smoker. Induction- intravenous. Postoperative Plan-     Informed Consent- Anesthetic plan and risks discussed with patient. I personally reviewed this patient with the CRNA. Discussed and agreed on the Anesthesia Plan with the CRNA. Christina Menendez negative...

## 2024-02-18 NOTE — INPATIENT CERTIFICATION FOR MEDICARE PATIENTS - THE STATUS OF COMORBIDITIES.
Patient recently admitted and received IV antibiotics for right lower extremity cellulitis [facility].  Patient was discharged on p.o. Keflex and returned with worsening erythema and swelling to right lower extremity.  No fever or chills reported.  No leukocytosis.  Lactic acid 0.7.  Elevated CRP 33.2 and sed rate 112.  Ultrasound negative for DVT    -monitor CBC   -continue IV Zosyn   -continue IV vancomycin  - antibiotic regimen switch to IV vancomycin and IV ceftriaxone, monitor for clinical improvement   - erythema improving but significant edema persists, will repeat CT to r/o fluid collection   2. The status of comorbities. (See ED/admit documents)